# Patient Record
Sex: FEMALE | Race: BLACK OR AFRICAN AMERICAN | NOT HISPANIC OR LATINO | Employment: OTHER | URBAN - METROPOLITAN AREA
[De-identification: names, ages, dates, MRNs, and addresses within clinical notes are randomized per-mention and may not be internally consistent; named-entity substitution may affect disease eponyms.]

---

## 2017-12-09 ENCOUNTER — HOSPITAL ENCOUNTER (EMERGENCY)
Facility: HOSPITAL | Age: 82
Discharge: HOME | End: 2017-12-10
Attending: EMERGENCY MEDICINE
Payer: MEDICARE

## 2017-12-09 VITALS
SYSTOLIC BLOOD PRESSURE: 156 MMHG | WEIGHT: 105 LBS | BODY MASS INDEX: 22.04 KG/M2 | HEIGHT: 58 IN | TEMPERATURE: 99 F | DIASTOLIC BLOOD PRESSURE: 65 MMHG | RESPIRATION RATE: 16 BRPM | HEART RATE: 65 BPM | OXYGEN SATURATION: 100 %

## 2017-12-09 DIAGNOSIS — R52 PAIN: ICD-10-CM

## 2017-12-09 DIAGNOSIS — S80.12XA CONTUSION OF LEFT LOWER LEG, INITIAL ENCOUNTER: Primary | ICD-10-CM

## 2017-12-09 DIAGNOSIS — S89.92XA SHIN INJURY, LEFT, INITIAL ENCOUNTER: ICD-10-CM

## 2017-12-09 PROCEDURE — 25000003 PHARM REV CODE 250: Performed by: EMERGENCY MEDICINE

## 2017-12-09 PROCEDURE — 99283 EMERGENCY DEPT VISIT LOW MDM: CPT

## 2017-12-09 RX ORDER — ACETAMINOPHEN 325 MG/1
650 TABLET ORAL
Status: COMPLETED | OUTPATIENT
Start: 2017-12-09 | End: 2017-12-09

## 2017-12-09 RX ORDER — CALCIUM CARBONATE 500(1250)
1 TABLET ORAL DAILY
COMMUNITY
End: 2019-08-16 | Stop reason: CLARIF

## 2017-12-09 RX ADMIN — ACETAMINOPHEN 650 MG: 325 TABLET ORAL at 11:12

## 2017-12-10 NOTE — ED NOTES
Patient identifiers for Gladys A Severin checked and correct.  LOC:  Patient is awake, alert, and aware of environment with an appropriate affect. Patient is speaking appropriately.  APPEARANCE:  Patient resting comfortably and in no acute distress. Patient is clean and well groomed, patient's clothing is properly fastened.  SKIN:  The skin is warm and dry. Patient has normal skin turgor and moist mucus membranes. Skin is intact; no bruising or breakdown noted.  Mild redness and swelling noted 8 inches distal to knee.  No known injury S/T pt pt dementia   MUSCULOSKELETAL:  Patient is moving all extremities well, no obvious deformities noted. Pulses intact.   RESPIRATORY:  Airway is open and patent. Respirations are spontaneous and non-labored with normal effort and rate.  CARDIAC:  Patient has a normal rate and rhythm. No peripheral edema noted. Capillary refill < 3 seconds.  ABDOMEN:  No distention noted.  Soft and non-tender upon palpation.  NEUROLOGICAL:  PERRL. Facial expression is symmetrical. Hand grasps are equal bilaterally. Normal sensation in all extremities when touched with finger.  Allergies reported:    Review of patient's allergies indicates:   Allergen Reactions    Promethazine      OTHER NOTES:

## 2017-12-10 NOTE — ED PROVIDER NOTES
"Encounter Date: 12/9/2017    SCRIBE #1 NOTE: I, Jacqueline Stephens, am scribing for, and in the presence of,  Dr. Gutierrez. I have scribed the entire note.       History     Chief Complaint   Patient presents with    Leg Pain     Lep pain.  Noted swelling to leg.         12/09/2017 11:13 PM     Chief complaint: Left manley pain      Gladys A Severin is a 89 y.o. female who presents to the ED with an onset of left shin pain with associated swelling after the patient hit her leg on the edge of a piece of furniture "around 4 PM today." Her relative gave her "extra strength Tylenol at 7 PM" with no relief. Patient's PMHx includes dementia. Her PSHx includes a right leg surgery.       The history is provided by the patient and a relative.     Review of patient's allergies indicates:   Allergen Reactions    Promethazine      History reviewed. No pertinent past medical history.  Past Surgical History:   Procedure Laterality Date    LEG SURGERY Right      History reviewed. No pertinent family history.  Social History   Substance Use Topics    Smoking status: Never Smoker    Smokeless tobacco: Never Used    Alcohol use No     Review of Systems   Constitutional: Negative for fever.   HENT: Negative for congestion.    Eyes: Negative for visual disturbance.   Respiratory: Negative for wheezing.    Cardiovascular: Negative for chest pain.   Gastrointestinal: Negative for abdominal pain.   Genitourinary: Negative for dysuria.   Musculoskeletal: Negative for joint swelling.        Positive for left shin pain with associated swelling.    Skin: Negative for rash.   Neurological: Negative for syncope.   Hematological: Does not bruise/bleed easily.   Psychiatric/Behavioral: Negative for confusion.       Physical Exam     Initial Vitals [12/09/17 2153]   BP Pulse Resp Temp SpO2   (!) 156/65 65 16 98.7 °F (37.1 °C) 100 %      MAP       95.33         Physical Exam    Nursing note and vitals reviewed.  Constitutional: She appears " well-nourished.   HENT:   Head: Normocephalic and atraumatic.   Eyes: Conjunctivae and EOM are normal.   Neck: Normal range of motion. Neck supple. No thyroid mass present.   Cardiovascular: Normal rate and regular rhythm.   Abdominal: Soft. Normal appearance and bowel sounds are normal. There is no tenderness.   Musculoskeletal:   Small area of erythema and swelling to left mid anterior shin. No open wounds. No additional leg swelling. No knee or ankle involvement.    Neurological: She is alert and oriented to person, place, and time. She has normal strength. No cranial nerve deficit or sensory deficit.   Skin: Skin is warm and dry. No rash noted. No erythema.   Psychiatric: She has a normal mood and affect. Her speech is normal. Cognition and memory are normal.         ED Course   Procedures  Labs Reviewed - No data to display     Imaging Results    None            Medical Decision Making:   History:   Old Medical Records: I decided to obtain old medical records.  Initial Assessment:   This patient was interviewed and examined in the presence of her daughter is noted be without gross bony abnormality or neurovascular deficit.  Does appear she has localized contusion sitting very to contact injury.  She has a past history of tibial tibia rodding.  Radiograph will be obtained.  The patient was provided oral acetaminophen.  Tetanus is not warranted secondary to the absence of open wounds.  Clinical Tests:   Radiological Study: Reviewed and Ordered  ED Management:  X-rays negative for acute fracture and the patient will be educated about supportive care at home.  She is asked to follow-up with her primary care provider as soon as possible regarding improvement.  She is additionally asked to return to the ER for any new, concerning, or worsening symptoms.  Patient discharged in stable condition.            Scribe Attestation:   Scribe #1: I performed the above scribed service and the documentation accurately describes  the services I performed. I attest to the accuracy of the note.    I, Dr. Jordin Gutierrez, personally performed the services described in this documentation. All medical record entries made by the scribe were at my direction and in my presence.  I have reviewed the chart and agree that the record reflects my personal performance and is accurate and complete. Jordin Gutierrez MD.  3:10 AM 12/10/2017          ED Course      Clinical Impression:     1. Contusion of left lower leg, initial encounter    2. Pain    3. Shin injury, left, initial encounter          Disposition:   Disposition: Discharged  Condition: Stable                        Jordin Gutierrez MD  12/10/17 0310

## 2018-02-08 DIAGNOSIS — R47.02 DYSPHASIA: Primary | ICD-10-CM

## 2018-02-20 ENCOUNTER — HOSPITAL ENCOUNTER (OUTPATIENT)
Dept: RADIOLOGY | Facility: OTHER | Age: 83
Discharge: HOME | End: 2018-02-20
Attending: FAMILY MEDICINE
Payer: MEDICARE

## 2018-02-20 DIAGNOSIS — R47.02 DYSPHASIA: ICD-10-CM

## 2018-02-20 PROCEDURE — 74220 X-RAY XM ESOPHAGUS 1CNTRST: CPT | Mod: TC

## 2018-02-20 PROCEDURE — 74220 X-RAY XM ESOPHAGUS 1CNTRST: CPT | Mod: 26,,, | Performed by: RADIOLOGY

## 2018-03-05 ENCOUNTER — HOSPITAL ENCOUNTER (OUTPATIENT)
Dept: RADIOLOGY | Facility: OTHER | Age: 83
Discharge: HOME | End: 2018-03-05
Attending: FAMILY MEDICINE
Payer: MEDICARE

## 2018-03-05 DIAGNOSIS — F03.90 DEMENTIA, SENILE: ICD-10-CM

## 2018-03-05 DIAGNOSIS — F03.90 DEMENTIA: ICD-10-CM

## 2018-03-05 PROCEDURE — 70450 CT HEAD/BRAIN W/O DYE: CPT | Mod: TC

## 2018-03-05 PROCEDURE — 70450 CT HEAD/BRAIN W/O DYE: CPT | Mod: 26,,, | Performed by: RADIOLOGY

## 2018-08-12 ENCOUNTER — HOSPITAL ENCOUNTER (EMERGENCY)
Facility: HOSPITAL | Age: 83
Discharge: HOME | End: 2018-08-12
Attending: EMERGENCY MEDICINE
Payer: MEDICARE

## 2018-08-12 VITALS
HEIGHT: 58 IN | RESPIRATION RATE: 20 BRPM | OXYGEN SATURATION: 98 % | TEMPERATURE: 98 F | HEART RATE: 63 BPM | DIASTOLIC BLOOD PRESSURE: 72 MMHG | WEIGHT: 105 LBS | BODY MASS INDEX: 22.04 KG/M2 | SYSTOLIC BLOOD PRESSURE: 160 MMHG

## 2018-08-12 DIAGNOSIS — S30.0XXA CONTUSION OF SACRUM, INITIAL ENCOUNTER: Primary | ICD-10-CM
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
## 2018-08-12 PROCEDURE — 99284 EMERGENCY DEPT VISIT MOD MDM: CPT | Mod: 25

## 2018-08-12 NOTE — ED PROVIDER NOTES
Encounter Date: 8/12/2018    SCRIBE #1 NOTE: I, Mandy Webster, am scribing for, and in the presence of, Aure Roth NP.       History     Chief Complaint   Patient presents with    Fall     from chair    Tailbone Pain     ambulatory at scene        08/12/2018  11:58 AM      The patient is a 89 y.o. female with Hx of alzheimer's and osteoarthritis who presents with tailbone pain for approximately 1 hour. Patient slid out of a lounge chair and onto her tailbone. She tried to stop her fall by grabbing the chair with her right hand. The incident was witnessed by her granddaughter. Her granddaughter states she was complaining of right hand pain immediately after the incident. Her granddaughter denies loss of consciousness or head injury. PSHx includes right leg fracture surgery and bilateral knee replacement.       The history is provided by the patient and a relative. The history is limited by the condition of the patient (dementia).     Review of patient's allergies indicates:   Allergen Reactions    Promethazine      History reviewed. No pertinent past medical history.  Past Surgical History:   Procedure Laterality Date    LEG SURGERY Right      History reviewed. No pertinent family history.  Social History     Tobacco Use    Smoking status: Never Smoker    Smokeless tobacco: Never Used   Substance Use Topics    Alcohol use: No    Drug use: No     Review of Systems   Unable to perform ROS: Dementia   Musculoskeletal: Positive for arthralgias (right hand) and myalgias.       Physical Exam     Initial Vitals [08/12/18 1153]   BP Pulse Resp Temp SpO2   (!) 98/57 77 20 97.5 °F (36.4 °C) (!) 94 %      MAP       --         Physical Exam    Nursing note and vitals reviewed.  Constitutional: Vital signs are normal. She appears well-developed and well-nourished.   Patient is able to walk around the room and bend over to touch her toes.   HENT:   Head: Normocephalic and atraumatic.   Eyes: Pupils are equal, round, and  reactive to light.   Neck: Neck supple.   Cardiovascular: Normal rate, regular rhythm, normal heart sounds and intact distal pulses. Exam reveals no gallop and no friction rub.    No murmur heard.  Pulmonary/Chest: Breath sounds normal. She has no wheezes. She has no rhonchi. She has no rales.   Abdominal: Soft. Normal appearance and bowel sounds are normal. There is no tenderness.   Musculoskeletal: She exhibits tenderness.        Cervical back: Normal.        Thoracic back: Normal.        Lumbar back: She exhibits tenderness and bony tenderness. She exhibits normal range of motion, no swelling, no edema, no deformity, no laceration, no pain, no spasm and normal pulse.        Back:         Right hand: Normal.        Left hand: Normal.   Tenderness over coccyx.    Neurological: She is alert and oriented to person, place, and time. She has normal strength. No sensory deficit.   5/5 strength and normal sensation bilateral lower extremities.   Skin: Skin is warm, dry and intact.   Psychiatric: She has a normal mood and affect. Her speech is normal and behavior is normal.         ED Course   Procedures  Labs Reviewed - No data to display       Imaging Results    None          Medical Decision Making:   History:   Old Medical Records: I decided to obtain old medical records.  Differential Diagnosis:   Fracture  Contusion  Muscle strain   Clinical Tests:   Radiological Study: Ordered and Reviewed       APC / Resident Notes:   Patient is a 89 y.o. female who presents to the ED 08/12/2018 who underwent emergent evaluation for a ground level fall.  Patient was attempting to sit in chair and landed on her buttocks.  Fall was witnessed by family member who is present who states that she did not hit her head or lose consciousness.  She is pleasantly confused at baseline due to her Alzheimer's.  There are no signs of head trauma.  She denies neck pain.  She has no midline C or T-spine tenderness. Patient has 5/5 strength and  normal sensation bilateral upper and lower extremities. Patient has tenderness only over her coccyx.  There is no bony deformity, ecchymosis, swelling. Patient is ambulatory in the emergency department and can bend forward and touch her toes without pain or difficulty.  I have a low suspicion for any fracture or pelvic fracture.  X-ray of sacrum and coccyx without acute findings.  Patient does have significant osteoarthritis and degenerative joint disease.  I do not think any acute fracture dislocation is present.  Patient has +2 distal pulses. No signs of neurovascular compromise. Based on my clinical evaluation, I do not appreciate any immediate, emergent, or life threatening condition or etiology that warrants additional workup today and feel that the patient can be discharged with close follow up care. Case discussed with Dr. De La Vega who is agreeable to plan of care. Follow up and return precautions discussed; patient verbalized understanding and is agreeable to plan of care. Patient discharged home in stable condition.               Scribe Attestation:   Scribe #1: I performed the above scribed service and the documentation accurately describes the services I performed. I attest to the accuracy of the note.    Attending Attestation:     Physician Attestation Statement for NP/PA:   I discussed this assessment and plan of this patient with the NP/PA, but I did not personally examine the patient. The face to face encounter was performed by the NP/PA.        Physician Attestation for Scribe:  Physician Attestation Statement for Scribe #1: I, Aure Roth, reviewed documentation, as scribed by in my presence, and it is both accurate and complete.     Comments: I, Aure Roth, NP-C, personally performed the services described in this documentation. All medical record entries made by the scribe were at my direction and in my presence.  I have reviewed the chart and agree that the record reflects my personal  performance and is accurate and complete. JAJA Jauregui.  10:04 PM 08/12/2018 e            ED Course as of Aug 12 1205   Sun Aug 12, 2018   1205 BP: (!) 98/57 [EF]   1205 Temp: 97.5 °F (36.4 °C) [EF]   1205 Temp src: Oral [EF]   1205 Pulse: 77 [EF]   1205 Resp: 20 [EF]   1205 SpO2: (!) 94 % [EF]      ED Course User Index  [EF] Ashok De La Vega MD     Clinical Impression:   The primary encounter diagnosis was Contusion of sacrum, initial encounter. A diagnosis of Fall was also pertinent to this visit.      Disposition:   Disposition: Discharged  Condition: Stable                        Aure Roth NP  08/12/18 2204       Ashok De La Vega MD  08/15/18 0791

## 2019-07-17 ENCOUNTER — HOSPITAL ENCOUNTER (EMERGENCY)
Facility: HOSPITAL | Age: 84
Discharge: HOME | End: 2019-07-17
Attending: EMERGENCY MEDICINE | Admitting: INTERNAL MEDICINE
Payer: MEDICARE

## 2019-07-17 VITALS
HEART RATE: 86 BPM | SYSTOLIC BLOOD PRESSURE: 158 MMHG | BODY MASS INDEX: 17.42 KG/M2 | DIASTOLIC BLOOD PRESSURE: 74 MMHG | HEIGHT: 58 IN | RESPIRATION RATE: 20 BRPM | OXYGEN SATURATION: 98 % | TEMPERATURE: 98 F | WEIGHT: 83 LBS

## 2019-07-17 DIAGNOSIS — R63.4 WEIGHT LOSS: ICD-10-CM

## 2019-07-17 DIAGNOSIS — E86.0 DEHYDRATION: Primary | ICD-10-CM

## 2019-07-17 LAB
ALBUMIN SERPL BCP-MCNC: 3.4 G/DL (ref 3.5–5.2)
ALP SERPL-CCNC: 142 U/L (ref 55–135)
ALT SERPL W/O P-5'-P-CCNC: 5 U/L (ref 10–44)
ANION GAP SERPL CALC-SCNC: 13 MMOL/L (ref 8–16)
AST SERPL-CCNC: 19 U/L (ref 10–40)
BASOPHILS # BLD AUTO: 0.02 K/UL (ref 0–0.2)
BASOPHILS NFR BLD: 0.2 % (ref 0–1.9)
BILIRUB SERPL-MCNC: 0.6 MG/DL (ref 0.1–1)
BILIRUB UR QL STRIP: ABNORMAL
BUN SERPL-MCNC: 13 MG/DL (ref 10–30)
CALCIUM SERPL-MCNC: 10.3 MG/DL (ref 8.7–10.5)
CHLORIDE SERPL-SCNC: 104 MMOL/L (ref 95–110)
CLARITY UR: CLEAR
CO2 SERPL-SCNC: 25 MMOL/L (ref 23–29)
COLOR UR: YELLOW
CREAT SERPL-MCNC: 0.9 MG/DL (ref 0.5–1.4)
DIFFERENTIAL METHOD: ABNORMAL
EOSINOPHIL # BLD AUTO: 0 K/UL (ref 0–0.5)
EOSINOPHIL NFR BLD: 0.2 % (ref 0–8)
ERYTHROCYTE [DISTWIDTH] IN BLOOD BY AUTOMATED COUNT: 12.9 % (ref 11.5–14.5)
EST. GFR  (AFRICAN AMERICAN): >60 ML/MIN/1.73 M^2
EST. GFR  (NON AFRICAN AMERICAN): 55 ML/MIN/1.73 M^2
GLUCOSE SERPL-MCNC: 133 MG/DL (ref 70–110)
GLUCOSE UR QL STRIP: NEGATIVE
HCT VFR BLD AUTO: 37.1 % (ref 37–48.5)
HGB BLD-MCNC: 11.3 G/DL (ref 12–16)
HGB UR QL STRIP: ABNORMAL
IMM GRANULOCYTES # BLD AUTO: 0.04 K/UL (ref 0–0.04)
KETONES UR QL STRIP: ABNORMAL
LEUKOCYTE ESTERASE UR QL STRIP: NEGATIVE
LIPASE SERPL-CCNC: 20 U/L (ref 4–60)
LYMPHOCYTES # BLD AUTO: 1.2 K/UL (ref 1–4.8)
LYMPHOCYTES NFR BLD: 11 % (ref 18–48)
MAGNESIUM SERPL-MCNC: 2.1 MG/DL (ref 1.6–2.6)
MCH RBC QN AUTO: 27.9 PG (ref 27–31)
MCHC RBC AUTO-ENTMCNC: 30.5 G/DL (ref 32–36)
MCV RBC AUTO: 92 FL (ref 82–98)
MONOCYTES # BLD AUTO: 0.8 K/UL (ref 0.3–1)
MONOCYTES NFR BLD: 7.5 % (ref 4–15)
NEUTROPHILS # BLD AUTO: 9.1 K/UL (ref 1.8–7.7)
NEUTROPHILS NFR BLD: 80.7 % (ref 38–73)
NITRITE UR QL STRIP: NEGATIVE
NRBC BLD-RTO: 0 /100 WBC
PH UR STRIP: 6 [PH] (ref 5–8)
PHOSPHATE SERPL-MCNC: 3 MG/DL (ref 2.7–4.5)
PLATELET # BLD AUTO: 361 K/UL (ref 150–350)
PMV BLD AUTO: 8.8 FL (ref 9.2–12.9)
POTASSIUM SERPL-SCNC: 3.8 MMOL/L (ref 3.5–5.1)
PROT SERPL-MCNC: 7.6 G/DL (ref 6–8.4)
PROT UR QL STRIP: ABNORMAL
RBC # BLD AUTO: 4.05 M/UL (ref 4–5.4)
SODIUM SERPL-SCNC: 142 MMOL/L (ref 136–145)
SP GR UR STRIP: >=1.03 (ref 1–1.03)
URN SPEC COLLECT METH UR: ABNORMAL
UROBILINOGEN UR STRIP-ACNC: ABNORMAL EU/DL
WBC # BLD AUTO: 11.21 K/UL (ref 3.9–12.7)

## 2019-07-17 PROCEDURE — 96360 HYDRATION IV INFUSION INIT: CPT

## 2019-07-17 PROCEDURE — 83690 ASSAY OF LIPASE: CPT

## 2019-07-17 PROCEDURE — 84100 ASSAY OF PHOSPHORUS: CPT

## 2019-07-17 PROCEDURE — 81003 URINALYSIS AUTO W/O SCOPE: CPT

## 2019-07-17 PROCEDURE — 36415 COLL VENOUS BLD VENIPUNCTURE: CPT

## 2019-07-17 PROCEDURE — 25000003 PHARM REV CODE 250: Performed by: EMERGENCY MEDICINE

## 2019-07-17 PROCEDURE — 85025 COMPLETE CBC W/AUTO DIFF WBC: CPT

## 2019-07-17 PROCEDURE — 83735 ASSAY OF MAGNESIUM: CPT

## 2019-07-17 PROCEDURE — P9612 CATHETERIZE FOR URINE SPEC: HCPCS

## 2019-07-17 PROCEDURE — 99284 EMERGENCY DEPT VISIT MOD MDM: CPT | Mod: 25

## 2019-07-17 PROCEDURE — 12000002 HC ACUTE/MED SURGE SEMI-PRIVATE ROOM

## 2019-07-17 PROCEDURE — 80053 COMPREHEN METABOLIC PANEL: CPT

## 2019-07-17 RX ORDER — DONEPEZIL HYDROCHLORIDE 10 MG/1
10 TABLET, FILM COATED ORAL NIGHTLY
COMMUNITY

## 2019-07-17 RX ORDER — PANTOPRAZOLE SODIUM 40 MG/1
40 TABLET, DELAYED RELEASE ORAL DAILY
Qty: 30 TABLET | Refills: 3 | Status: SHIPPED | OUTPATIENT
Start: 2019-07-17 | End: 2019-08-16 | Stop reason: CLARIF

## 2019-07-17 RX ORDER — ONDANSETRON HYDROCHLORIDE 8 MG/1
8 TABLET, FILM COATED ORAL EVERY 12 HOURS PRN
Qty: 6 TABLET | Refills: 0 | Status: SHIPPED | OUTPATIENT
Start: 2019-07-17

## 2019-07-17 RX ORDER — PANTOPRAZOLE SODIUM 40 MG/1
40 TABLET, DELAYED RELEASE ORAL
Status: COMPLETED | OUTPATIENT
Start: 2019-07-17 | End: 2019-07-17

## 2019-07-17 RX ORDER — ACETAMINOPHEN 325 MG/1
325 TABLET ORAL
Status: COMPLETED | OUTPATIENT
Start: 2019-07-17 | End: 2019-07-17

## 2019-07-17 RX ORDER — BUTALBITAL, ACETAMINOPHEN AND CAFFEINE 50; 325; 40 MG/1; MG/1; MG/1
1 TABLET ORAL
Status: COMPLETED | OUTPATIENT
Start: 2019-07-17 | End: 2019-07-17

## 2019-07-17 RX ORDER — MELOXICAM 7.5 MG/1
7.5 TABLET ORAL DAILY
COMMUNITY
End: 2019-08-16 | Stop reason: CLARIF

## 2019-07-17 RX ADMIN — SODIUM CHLORIDE 500 ML: 0.9 INJECTION, SOLUTION INTRAVENOUS at 08:07

## 2019-07-17 RX ADMIN — PANTOPRAZOLE SODIUM 40 MG: 40 TABLET, DELAYED RELEASE ORAL at 08:07

## 2019-07-17 RX ADMIN — BUTALBITAL, ACETAMINOPHEN AND CAFFEINE 1 TABLET: 50; 325; 40 TABLET ORAL at 08:07

## 2019-07-17 RX ADMIN — ACETAMINOPHEN 325 MG: 325 TABLET ORAL at 08:07

## 2019-07-18 NOTE — ED PROVIDER NOTES
Encounter Date: 7/17/2019    SCRIBE #1 NOTE: I, Fátima Mercado, am scribing for, and in the presence of, Ion Mckeon MD.       History     Chief Complaint   Patient presents with    Emesis     started today    Headache     and neck pain - x 2 weeks     Abdominal Pain     mid/epigastric        Time seen by provider: 7:40 PM on 07/17/2019    Gladys Severin is a 89 y.o. female who presents to the ED with an onset of neck pain and intermittent HA's over the past 2 weeks with associated nausea and vomiting. Per daughter, the patient has been seen by his PCP, Dr. Fany Majano, for her symptoms and has had a XR of her neck that revealed DDD. Dr. Majano referred her to the ER for an MRI of the patient's head due to insurance reasons. The daughter is concerned for possible dehydration due to the patient feeling weak and not eating with subsequent 7 lbs weight loss. Currently, her HA has subsided. The patient is currently on Meloxicam for ongoing thrush x3 years. She resides with her daughter. The daughter denies history of a colonoscopy or any other symptoms at this time. She has a PMHx of dementia. No abdominal PSHx noted. Promethazine drug allergy noted.    The history is provided by the patient and a relative (daughter).     Review of patient's allergies indicates:   Allergen Reactions    Promethazine      History reviewed. No pertinent past medical history.  Past Surgical History:   Procedure Laterality Date    LEG SURGERY Right      History reviewed. No pertinent family history.  Social History     Tobacco Use    Smoking status: Never Smoker    Smokeless tobacco: Never Used   Substance Use Topics    Alcohol use: No    Drug use: No     Review of Systems   Constitutional: Positive for appetite change and unexpected weight change (7 lbs / 2 weeks). Negative for activity change, chills, fatigue and fever.   HENT: Positive for trouble swallowing (secondary to thrush).    Eyes: Negative for visual disturbance.    Respiratory: Negative for apnea and shortness of breath.    Cardiovascular: Negative for chest pain and palpitations.   Gastrointestinal: Positive for nausea and vomiting. Negative for abdominal distention and abdominal pain.   Genitourinary: Negative for difficulty urinating.   Musculoskeletal: Positive for neck pain.   Skin: Negative for pallor and rash.   Neurological: Positive for weakness (generalized) and headaches (resolved).   Hematological: Does not bruise/bleed easily.   Psychiatric/Behavioral: Negative for agitation.     Physical Exam     Initial Vitals [07/17/19 1928]   BP Pulse Resp Temp SpO2   (!) 182/84 76 20 97.6 °F (36.4 °C) 100 %      MAP       --         Physical Exam    Nursing note and vitals reviewed.  Constitutional: She appears well-developed and well-nourished.   [place].    HENT:   Head: Normocephalic and atraumatic.   Eyes: Conjunctivae are normal.   Neck: Normal range of motion. Neck supple.   Cardiovascular: Normal rate and regular rhythm. Exam reveals no gallop and no friction rub.    Murmur heard.   Systolic murmur is present with a grade of 3/6.  Pulmonary/Chest: Effort normal and breath sounds normal. No respiratory distress. She has no wheezes. She has no rhonchi. She has no rales.   Abdominal: Soft. She exhibits no distension. There is no tenderness.   Soft, non-tender abdomen.    Musculoskeletal: Normal range of motion.   Neurological: She is alert. She has normal strength.   Skin: Skin is warm and dry. No erythema.   Psychiatric: She has a normal mood and affect.       ED Course   Procedures  Labs Reviewed   COMPREHENSIVE METABOLIC PANEL - Abnormal; Notable for the following components:       Result Value    Glucose 133 (*)     Albumin 3.4 (*)     Alkaline Phosphatase 142 (*)     ALT 5 (*)     eGFR if non  55 (*)     All other components within normal limits   CBC W/ AUTO DIFFERENTIAL - Abnormal; Notable for the following components:    Hemoglobin 11.3 (*)     Mean  Corpuscular Hemoglobin Conc 30.5 (*)     Platelets 361 (*)     MPV 8.8 (*)     Gran # (ANC) 9.1 (*)     Gran% 80.7 (*)     Lymph% 11.0 (*)     All other components within normal limits   URINALYSIS, REFLEX TO URINE CULTURE - Abnormal; Notable for the following components:    Specific Gravity, UA >=1.030 (*)     Protein, UA Trace (*)     Ketones, UA Trace (*)     Bilirubin (UA) 1+ (*)     Occult Blood UA Trace (*)     Urobilinogen, UA 2.0-3.0 (*)     All other components within normal limits    Narrative:     Preferred Collection Type->Urine, Clean Catch   MAGNESIUM   PHOSPHORUS   LIPASE        Imaging Results          X-Ray Chest AP Portable (In process)                  Medical Decision Making:   History:   Old Medical Records: I decided to obtain old medical records.  Independently Interpreted Test(s):   I have ordered and independently interpreted X-rays - see summary below.       <> Summary of X-Ray Reading(s): Chest x-ray interpreted by me reveals no infiltrates, effusions or mediastinal widening  Clinical Tests:   Lab Tests: Ordered and Reviewed  Radiological Study: Reviewed and Ordered  ED Management:  89-year-old female presents with a multitude of complaints and includes but is not limited to headache, neck pain, postprandial emesis with associated weight loss and generalized weakness. She adamantly denies headache. She has known advanced degenerative disc disease of the cervical spine.  The symptoms most likely account for the lower cranial pain.  Absence of ongoing headache and absence of focal neurologic deficits make immediate imaging necessary.  She will be placed on a PPI for possible gastritis/esophagitis.  She is given Zofran for nausea vomiting and referred to Gastroenterology for evaluation of same.  Chest x-ray fails to demonstrate any acute abnormalities.  She has an elevated urine specific gravity suggesting dehydration.  She reports symptomatic improvement with IV fluids.       APC / Resident  Notes:   I, Dr. Ion Mckeon III, personally performed the services described in this documentation. All medical record entries made by the scribe were at my direction and in my presence.  I have reviewed the chart and agree that the record reflects my personal performance and is accurate and complete       Scribe Attestation:   Scribe #1: I performed the above scribed service and the documentation accurately describes the services I performed. I attest to the accuracy of the note.               Clinical Impression:       ICD-10-CM ICD-9-CM   1. Dehydration E86.0 276.51   2. Weight loss R63.4 783.21         Disposition:   Disposition: Discharged  Condition: Stable                        Ion Mckeon III, MD  07/17/19 0309

## 2019-08-16 ENCOUNTER — HOSPITAL ENCOUNTER (INPATIENT)
Facility: HOSPITAL | Age: 84
LOS: 3 days | Discharge: HOME HEALTH | DRG: 640 | End: 2019-08-19
Attending: EMERGENCY MEDICINE | Admitting: INTERNAL MEDICINE
Payer: MEDICARE

## 2019-08-16 DIAGNOSIS — E86.0 DEHYDRATION: ICD-10-CM

## 2019-08-16 DIAGNOSIS — E87.0 HYPERNATREMIA: Primary | ICD-10-CM

## 2019-08-16 PROBLEM — R63.4 WEIGHT LOSS: Status: ACTIVE | Noted: 2019-08-16

## 2019-08-16 PROBLEM — N17.9 ACUTE RENAL FAILURE: Status: ACTIVE | Noted: 2019-08-16

## 2019-08-16 PROBLEM — K21.9 GERD (GASTROESOPHAGEAL REFLUX DISEASE): Status: ACTIVE | Noted: 2019-08-16

## 2019-08-16 PROBLEM — F03.918 DEMENTIA WITH BEHAVIORAL DISTURBANCE: Status: ACTIVE | Noted: 2019-08-16

## 2019-08-16 PROBLEM — R73.9 HYPERGLYCEMIA: Status: ACTIVE | Noted: 2019-08-16

## 2019-08-16 LAB
ALBUMIN SERPL BCP-MCNC: 3 G/DL (ref 3.5–5.2)
ALP SERPL-CCNC: 133 U/L (ref 55–135)
ALT SERPL W/O P-5'-P-CCNC: 32 U/L (ref 10–44)
ANION GAP SERPL CALC-SCNC: ABNORMAL MMOL/L (ref 8–16)
AST SERPL-CCNC: 47 U/L (ref 10–40)
BACTERIA #/AREA URNS HPF: ABNORMAL /HPF
BASOPHILS # BLD AUTO: 0.02 K/UL (ref 0–0.2)
BASOPHILS NFR BLD: 0.2 % (ref 0–1.9)
BILIRUB SERPL-MCNC: 1 MG/DL (ref 0.1–1)
BILIRUB UR QL STRIP: ABNORMAL
BUN SERPL-MCNC: 39 MG/DL (ref 10–30)
CALCIUM SERPL-MCNC: 10.5 MG/DL (ref 8.7–10.5)
CHLORIDE SERPL-SCNC: >130 MMOL/L (ref 95–110)
CLARITY UR: CLEAR
CO2 SERPL-SCNC: 24 MMOL/L (ref 23–29)
COLOR UR: YELLOW
CREAT SERPL-MCNC: 1.4 MG/DL (ref 0.5–1.4)
DIFFERENTIAL METHOD: ABNORMAL
EOSINOPHIL # BLD AUTO: 0 K/UL (ref 0–0.5)
EOSINOPHIL NFR BLD: 0.3 % (ref 0–8)
ERYTHROCYTE [DISTWIDTH] IN BLOOD BY AUTOMATED COUNT: 15 % (ref 11.5–14.5)
EST. GFR  (AFRICAN AMERICAN): 37 ML/MIN/1.73 M^2
EST. GFR  (NON AFRICAN AMERICAN): 32 ML/MIN/1.73 M^2
GLUCOSE SERPL-MCNC: 156 MG/DL (ref 70–110)
GLUCOSE UR QL STRIP: NEGATIVE
HCT VFR BLD AUTO: 52.4 % (ref 37–48.5)
HGB BLD-MCNC: 14.8 G/DL (ref 12–16)
HGB UR QL STRIP: ABNORMAL
HYALINE CASTS #/AREA URNS LPF: 0 /LPF
IMM GRANULOCYTES # BLD AUTO: 0.09 K/UL (ref 0–0.04)
KETONES UR QL STRIP: NEGATIVE
LEUKOCYTE ESTERASE UR QL STRIP: ABNORMAL
LYMPHOCYTES # BLD AUTO: 2.2 K/UL (ref 1–4.8)
LYMPHOCYTES NFR BLD: 19.6 % (ref 18–48)
MCH RBC QN AUTO: 28.5 PG (ref 27–31)
MCHC RBC AUTO-ENTMCNC: 28.2 G/DL (ref 32–36)
MCV RBC AUTO: 101 FL (ref 82–98)
MICROSCOPIC COMMENT: ABNORMAL
MONOCYTES # BLD AUTO: 0.8 K/UL (ref 0.3–1)
MONOCYTES NFR BLD: 7.2 % (ref 4–15)
NEUTROPHILS # BLD AUTO: 8.1 K/UL (ref 1.8–7.7)
NEUTROPHILS NFR BLD: 71.9 % (ref 38–73)
NITRITE UR QL STRIP: NEGATIVE
NRBC BLD-RTO: 0 /100 WBC
PH UR STRIP: 6 [PH] (ref 5–8)
PLATELET # BLD AUTO: 195 K/UL (ref 150–350)
PMV BLD AUTO: 11.2 FL (ref 9.2–12.9)
POTASSIUM SERPL-SCNC: 3.8 MMOL/L (ref 3.5–5.1)
PROT SERPL-MCNC: 7.9 G/DL (ref 6–8.4)
PROT UR QL STRIP: ABNORMAL
RBC # BLD AUTO: 5.19 M/UL (ref 4–5.4)
RBC #/AREA URNS HPF: 0 /HPF (ref 0–4)
SODIUM SERPL-SCNC: 175 MMOL/L (ref 136–145)
SP GR UR STRIP: 1.02 (ref 1–1.03)
URN SPEC COLLECT METH UR: ABNORMAL
UROBILINOGEN UR STRIP-ACNC: ABNORMAL EU/DL
WBC # BLD AUTO: 11.27 K/UL (ref 3.9–12.7)
WBC #/AREA URNS HPF: 9 /HPF (ref 0–5)

## 2019-08-16 PROCEDURE — P9612 CATHETERIZE FOR URINE SPEC: HCPCS

## 2019-08-16 PROCEDURE — 94761 N-INVAS EAR/PLS OXIMETRY MLT: CPT

## 2019-08-16 PROCEDURE — 99291 CRITICAL CARE FIRST HOUR: CPT | Mod: 25

## 2019-08-16 PROCEDURE — 85025 COMPLETE CBC W/AUTO DIFF WBC: CPT

## 2019-08-16 PROCEDURE — 81000 URINALYSIS NONAUTO W/SCOPE: CPT

## 2019-08-16 PROCEDURE — 25000003 PHARM REV CODE 250: Performed by: INTERNAL MEDICINE

## 2019-08-16 PROCEDURE — 36415 COLL VENOUS BLD VENIPUNCTURE: CPT

## 2019-08-16 PROCEDURE — 80053 COMPREHEN METABOLIC PANEL: CPT

## 2019-08-16 PROCEDURE — 12000002 HC ACUTE/MED SURGE SEMI-PRIVATE ROOM

## 2019-08-16 PROCEDURE — 63600175 PHARM REV CODE 636 W HCPCS: Performed by: EMERGENCY MEDICINE

## 2019-08-16 PROCEDURE — C9113 INJ PANTOPRAZOLE SODIUM, VIA: HCPCS | Performed by: INTERNAL MEDICINE

## 2019-08-16 PROCEDURE — 63600175 PHARM REV CODE 636 W HCPCS: Performed by: INTERNAL MEDICINE

## 2019-08-16 RX ORDER — ONDANSETRON 2 MG/ML
8 INJECTION INTRAMUSCULAR; INTRAVENOUS EVERY 8 HOURS PRN
Status: DISCONTINUED | OUTPATIENT
Start: 2019-08-16 | End: 2019-08-19 | Stop reason: HOSPADM

## 2019-08-16 RX ORDER — RAMELTEON 8 MG/1
8 TABLET ORAL NIGHTLY PRN
Status: DISCONTINUED | OUTPATIENT
Start: 2019-08-16 | End: 2019-08-19 | Stop reason: HOSPADM

## 2019-08-16 RX ORDER — LANOLIN ALCOHOL/MO/W.PET/CERES
800 CREAM (GRAM) TOPICAL
Status: DISCONTINUED | OUTPATIENT
Start: 2019-08-16 | End: 2019-08-19 | Stop reason: HOSPADM

## 2019-08-16 RX ORDER — HEPARIN SODIUM 5000 [USP'U]/ML
5000 INJECTION, SOLUTION INTRAVENOUS; SUBCUTANEOUS EVERY 12 HOURS
Status: DISCONTINUED | OUTPATIENT
Start: 2019-08-16 | End: 2019-08-19 | Stop reason: HOSPADM

## 2019-08-16 RX ORDER — POTASSIUM CHLORIDE 20 MEQ/15ML
40 SOLUTION ORAL
Status: DISCONTINUED | OUTPATIENT
Start: 2019-08-16 | End: 2019-08-19 | Stop reason: HOSPADM

## 2019-08-16 RX ORDER — SODIUM CHLORIDE 0.9 % (FLUSH) 0.9 %
10 SYRINGE (ML) INJECTION
Status: DISCONTINUED | OUTPATIENT
Start: 2019-08-16 | End: 2019-08-19 | Stop reason: HOSPADM

## 2019-08-16 RX ORDER — IPRATROPIUM BROMIDE AND ALBUTEROL SULFATE 2.5; .5 MG/3ML; MG/3ML
3 SOLUTION RESPIRATORY (INHALATION) EVERY 6 HOURS PRN
COMMUNITY

## 2019-08-16 RX ORDER — AMOXICILLIN 250 MG
1 CAPSULE ORAL 2 TIMES DAILY
Status: DISCONTINUED | OUTPATIENT
Start: 2019-08-16 | End: 2019-08-19 | Stop reason: HOSPADM

## 2019-08-16 RX ORDER — LACTULOSE 10 G/15ML
10 SOLUTION ORAL; RECTAL DAILY PRN
COMMUNITY

## 2019-08-16 RX ORDER — ACETAMINOPHEN 500 MG
1000 TABLET ORAL EVERY 6 HOURS PRN
Status: DISCONTINUED | OUTPATIENT
Start: 2019-08-16 | End: 2019-08-19 | Stop reason: HOSPADM

## 2019-08-16 RX ORDER — DEXTROSE MONOHYDRATE 50 MG/ML
INJECTION, SOLUTION INTRAVENOUS CONTINUOUS
Status: DISCONTINUED | OUTPATIENT
Start: 2019-08-16 | End: 2019-08-17

## 2019-08-16 RX ADMIN — DEXTROSE 8 MG/HR: 50 INJECTION, SOLUTION INTRAVENOUS at 11:08

## 2019-08-16 RX ADMIN — SODIUM CHLORIDE 500 ML: 0.9 INJECTION, SOLUTION INTRAVENOUS at 02:08

## 2019-08-16 RX ADMIN — SENNOSIDES, DOCUSATE SODIUM 1 TABLET: 50; 8.6 TABLET, FILM COATED ORAL at 08:08

## 2019-08-16 RX ADMIN — HEPARIN SODIUM 5000 UNITS: 5000 INJECTION, SOLUTION INTRAVENOUS; SUBCUTANEOUS at 08:08

## 2019-08-16 RX ADMIN — DEXTROSE: 5 SOLUTION INTRAVENOUS at 08:08

## 2019-08-16 RX ADMIN — DEXTROSE 8 MG/HR: 50 INJECTION, SOLUTION INTRAVENOUS at 08:08

## 2019-08-16 NOTE — ASSESSMENT & PLAN NOTE
Apparently her symptoms are significant according to the daughter.  I will give her IV Protonix for now

## 2019-08-16 NOTE — ASSESSMENT & PLAN NOTE
The patient does not have a history of diabetes mellitus.  We will obtain Accu-Cheks q.a.c. and HS

## 2019-08-16 NOTE — ASSESSMENT & PLAN NOTE
Secondary to poor intake.  Unclear if it is her dementia or some other abnormality that caused her to lose her appetite.  I will check a TSH level.

## 2019-08-16 NOTE — SUBJECTIVE & OBJECTIVE
Past Medical History:   Diagnosis Date    GERD (gastroesophageal reflux disease)        Past Surgical History:   Procedure Laterality Date    LEG SURGERY Right        Review of patient's allergies indicates:   Allergen Reactions    Promethazine        No current facility-administered medications on file prior to encounter.      Current Outpatient Medications on File Prior to Encounter   Medication Sig    albuterol-ipratropium (DUO-NEB) 2.5 mg-0.5 mg/3 mL nebulizer solution Take 3 mLs by nebulization every 6 (six) hours as needed for Wheezing or Shortness of Breath. Rescue    donepezil (ARICEPT) 10 MG tablet Take 10 mg by mouth every evening.    lactulose (CHRONULAC) 10 gram/15 mL solution Take 10 g by mouth daily as needed (constipation).     ondansetron (ZOFRAN) 8 MG tablet Take 1 tablet (8 mg total) by mouth every 12 (twelve) hours as needed for Nausea. (Patient taking differently: Take 8 mg by mouth every 12 (twelve) hours as needed for Nausea. Take for 3 days.)    ranitidine (ZANTAC) 15 mg/mL syrup Take 75 mg by mouth every 12 (twelve) hours.    [DISCONTINUED] calcium carbonate (OS-LINCOLN) 500 mg calcium (1,250 mg) tablet Take 1 tablet by mouth once daily.    [DISCONTINUED] meloxicam (MOBIC) 7.5 MG tablet Take 7.5 mg by mouth once daily.    [DISCONTINUED] pantoprazole (PROTONIX) 40 MG tablet Take 1 tablet (40 mg total) by mouth once daily.     Family History     None        Tobacco Use    Smoking status: Never Smoker    Smokeless tobacco: Never Used   Substance and Sexual Activity    Alcohol use: No    Drug use: No    Sexual activity: Not on file     Review of Systems   Unable to perform ROS: Dementia     Objective:     Vital Signs (Most Recent):  Temp: 98.8 °F (37.1 °C) (08/16/19 1413)  Pulse: 100 (08/16/19 1640)  Resp: 20 (08/16/19 1413)  BP: 121/80 (08/16/19 1622)  SpO2: 99 % (08/16/19 1640) Vital Signs (24h Range):  Temp:  [98.8 °F (37.1 °C)] 98.8 °F (37.1 °C)  Pulse:  [] 100  Resp:  [20]  20  SpO2:  [92 %-99 %] 99 %  BP: (121-157)/(79-85) 121/80     Weight: 37.6 kg (83 lb)  Body mass index is 17.35 kg/m².    Physical Exam   Constitutional:   Bitemporal wasting   HENT:   Head: Normocephalic and atraumatic.   Eyes: Pupils are equal, round, and reactive to light. EOM are normal.   Neck: Normal range of motion. Neck supple. No JVD present.   Cardiovascular: Normal rate, regular rhythm and intact distal pulses.   Murmur heard.  Pulmonary/Chest: Effort normal and breath sounds normal. No respiratory distress. She has no rales.   Abdominal: Soft. Bowel sounds are normal. She exhibits no distension. There is no tenderness. There is no rebound.   Musculoskeletal: Normal range of motion. She exhibits no edema or deformity.   Neurological: She is alert. No cranial nerve deficit or sensory deficit.   The patient is confused and her speech is dysarthric   Skin: Skin is warm and dry. No rash noted.   Nursing note and vitals reviewed.        CRANIAL NERVES     CN III, IV, VI   Pupils are equal, round, and reactive to light.  Extraocular motions are normal.        Significant Labs:   CBC:   Recent Labs   Lab 08/16/19  1449   WBC 11.27   HGB 14.8   HCT 52.4*        CMP:   Recent Labs   Lab 08/16/19  1449   *   K 3.8   CL >130*   CO2 24   *   BUN 39*   CREATININE 1.4   CALCIUM 10.5   PROT 7.9   ALBUMIN 3.0*   BILITOT 1.0   ALKPHOS 133   AST 47*   ALT 32   ANIONGAP Unable to calculate   EGFRNONAA 32*       Significant Imaging: I have reviewed all pertinent imaging results/findings within the past 24 hours.

## 2019-08-16 NOTE — H&P
[facility] Medicine  History & Physical    Patient Name: Gladys A Severin  MRN: [id number]  Admission Date: 8/16/2019  Attending Physician: No att. providers found   Primary Care Provider: Fany Majano DO         Patient information was obtained from relative(s) and ER records.     Subjective:     Principal Problem:Hypernatremia    Chief Complaint:  Hypernatremia  Chief Complaint   Patient presents with    Fatigue     decreased appetite.        HPI: The patient is a 89-year-old woman who has not been doing well since July when she started complaining of neck pain. She then stopped eating or drinking much and has lost possibly 20 lbs according to her family.  She has become extremely weak and is no longer able to walk without max assistance.  Her dementia has also gotten worse.    The patient is unable to provide any history because of her dementia but her daughter who is her primary caregiver was able to answer all my questions.  The patient has not been vomiting and has not had any diarrhea.  She has not complained of pain.  She has not fallen.  She does not take medications other than Zantac for GERD.    Past Medical History:   Diagnosis Date    GERD (gastroesophageal reflux disease)        Past Surgical History:   Procedure Laterality Date    LEG SURGERY Right        Review of patient's allergies indicates:   Allergen Reactions    Promethazine        No current facility-administered medications on file prior to encounter.      Current Outpatient Medications on File Prior to Encounter   Medication Sig    albuterol-ipratropium (DUO-NEB) 2.5 mg-0.5 mg/3 mL nebulizer solution Take 3 mLs by nebulization every 6 (six) hours as needed for Wheezing or Shortness of Breath. Rescue    donepezil (ARICEPT) 10 MG tablet Take 10 mg by mouth every evening.    lactulose (CHRONULAC) 10 gram/15 mL solution Take 10 g by mouth daily as needed (constipation).     ondansetron (ZOFRAN) 8 MG tablet  Take 1 tablet (8 mg total) by mouth every 12 (twelve) hours as needed for Nausea. (Patient taking differently: Take 8 mg by mouth every 12 (twelve) hours as needed for Nausea. Take for 3 days.)    ranitidine (ZANTAC) 15 mg/mL syrup Take 75 mg by mouth every 12 (twelve) hours.    [DISCONTINUED] calcium carbonate (OS-LINCOLN) 500 mg calcium (1,250 mg) tablet Take 1 tablet by mouth once daily.    [DISCONTINUED] meloxicam (MOBIC) 7.5 MG tablet Take 7.5 mg by mouth once daily.    [DISCONTINUED] pantoprazole (PROTONIX) 40 MG tablet Take 1 tablet (40 mg total) by mouth once daily.     Family History     None        Tobacco Use    Smoking status: Never Smoker    Smokeless tobacco: Never Used   Substance and Sexual Activity    Alcohol use: No    Drug use: No    Sexual activity: Not on file     Review of Systems   Unable to perform ROS: Dementia     Objective:     Vital Signs (Most Recent):  Temp: 98.8 °F (37.1 °C) (08/16/19 1413)  Pulse: 100 (08/16/19 1640)  Resp: 20 (08/16/19 1413)  BP: 121/80 (08/16/19 1622)  SpO2: 99 % (08/16/19 1640) Vital Signs (24h Range):  Temp:  [98.8 °F (37.1 °C)] 98.8 °F (37.1 °C)  Pulse:  [] 100  Resp:  [20] 20  SpO2:  [92 %-99 %] 99 %  BP: (121-157)/(79-85) 121/80     Weight: 37.6 kg (83 lb)  Body mass index is 17.35 kg/m².    Physical Exam   Constitutional:   Bitemporal wasting   HENT:   Head: Normocephalic and atraumatic.   Eyes: Pupils are equal, round, and reactive to light. EOM are normal.   Neck: Normal range of motion. Neck supple. No JVD present.   Cardiovascular: Normal rate, regular rhythm and intact distal pulses.   Murmur heard.  Pulmonary/Chest: Effort normal and breath sounds normal. No respiratory distress. She has no rales.   Abdominal: Soft. Bowel sounds are normal. She exhibits no distension. There is no tenderness. There is no rebound.   Musculoskeletal: Normal range of motion. She exhibits no edema or deformity.   Neurological: She is alert. No cranial nerve deficit  or sensory deficit.   The patient is confused and her speech is dysarthric   Skin: Skin is warm and dry. No rash noted.   Nursing note and vitals reviewed.        CRANIAL NERVES     CN III, IV, VI   Pupils are equal, round, and reactive to light.  Extraocular motions are normal.        Significant Labs:   CBC:   Recent Labs   Lab 08/16/19  1449   WBC 11.27   HGB 14.8   HCT 52.4*        CMP:   Recent Labs   Lab 08/16/19  1449   *   K 3.8   CL >130*   CO2 24   *   BUN 39*   CREATININE 1.4   CALCIUM 10.5   PROT 7.9   ALBUMIN 3.0*   BILITOT 1.0   ALKPHOS 133   AST 47*   ALT 32   ANIONGAP Unable to calculate   EGFRNONAA 32*       Significant Imaging: I have reviewed all pertinent imaging results/findings within the past 24 hours.    Assessment/Plan:     * Hypernatremia  The patient has severe hypernatremia due to water deficiency.  She will be treated with D5W at 100 cc an hour and we will monitor her sodium closely.      Acute renal failure  Secondary to dehydration from poor oral intake      Hyperglycemia  The patient does not have a history of diabetes mellitus.  We will obtain Accu-Cheks q.a.c. and HS      Weight loss  Secondary to poor intake.  Unclear if it is her dementia or some other abnormality that caused her to lose her appetite.  I will check a TSH level.      Dementia with behavioral disturbance  The patient has underlying dementia but her mental status has gotten worse recently because of the dehydration and severe hypernatremia      GERD (gastroesophageal reflux disease)  Apparently her symptoms are significant according to the daughter.  I will give her IV Protonix for now      VTE Risk Mitigation (From admission, onward)        Ordered     heparin (porcine) injection 5,000 Units  Every 12 hours      08/16/19 1733     IP VTE HIGH RISK PATIENT  Once      08/16/19 1733             Brigitte Shelby MD  Department of Hospital Medicine   [facility]

## 2019-08-16 NOTE — ED PROVIDER NOTES
Encounter Date: 8/16/2019    SCRIBE #1 NOTE: I, Fátima Mercado, am scribing for, and in the presence of, Julio Arceo MD.       History     Chief Complaint   Patient presents with    Fatigue     decreased appetite.       Time seen by provider: 2:17 PM on 08/16/2019    Gladys Severin is a 89 y.o. female with DM and dementia who presents to the ED with an onset of dark urine. Per daughter, the patient not been eating or drinking for the past month. Two days ago, the daughter called for an ambulance to come to the given her IV fluids. She called her PCP earlier today and was referred to the ER for further evaluation. The patient PSHx including right leg surgery. Promethazine drug allergy.    The history is provided by the spouse, the patient and a relative ( & daughter).     Review of patient's allergies indicates:   Allergen Reactions    Promethazine      Past Medical History:   Diagnosis Date    GERD (gastroesophageal reflux disease)      Past Surgical History:   Procedure Laterality Date    LEG SURGERY Right      History reviewed. No pertinent family history.  Social History     Tobacco Use    Smoking status: Never Smoker    Smokeless tobacco: Never Used   Substance Use Topics    Alcohol use: No    Drug use: No     Review of Systems   Unable to perform ROS: Dementia   Constitutional: Positive for appetite change (& fluids).   Genitourinary:        Positive for urine color change.      Physical Exam     Initial Vitals [08/16/19 1413]   BP Pulse Resp Temp SpO2   125/82 (!) 149 20 98.8 °F (37.1 °C) 96 %      MAP       --         Physical Exam    Nursing note and vitals reviewed.  Constitutional: She appears well-developed and well-nourished. She is not diaphoretic. No distress.   HENT:   Head: Normocephalic and atraumatic.   Mouth/Throat: Mucous membranes are dry.   Eyes: EOM are normal. Pupils are equal, round, and reactive to light.   Neck: Normal range of motion. Neck supple.   Cardiovascular:  Regular rhythm, normal heart sounds and intact distal pulses. Tachycardia present.  Exam reveals no gallop and no friction rub.    No murmur heard.  Pulmonary/Chest: Breath sounds normal. No respiratory distress. She has no wheezes. She has no rhonchi. She has no rales.   Abdominal: Soft. Bowel sounds are normal. There is no tenderness. There is no rebound and no guarding.   Musculoskeletal: Normal range of motion.   Neurological: She is alert.   Baseline confusion.    Skin: Skin is warm and dry.   Psychiatric: She has a normal mood and affect. Her behavior is normal. Judgment and thought content normal.       ED Course   Critical Care  Performed by: Julio Arceo MD  Authorized by: Julio Arceo MD   Direct patient critical care time: 14 minutes  Additional history critical care time: 10 minutes  Ordering / reviewing critical care time: 9 minutes  Documentation critical care time: 9 minutes  Consulting other physicians critical care time: 5 minutes  Total critical care time (exclusive of procedural time) : 47 minutes  Critical care was time spent personally by me on the following activities: development of treatment plan with patient or surrogate, examination of patient, ordering and performing treatments and interventions, obtaining history from patient or surrogate and ordering and review of laboratory studies.        Labs Reviewed   COMPREHENSIVE METABOLIC PANEL - Abnormal; Notable for the following components:       Result Value    Sodium 175 (*)     Chloride >130 (*)     Glucose 156 (*)     BUN, Bld 39 (*)     Albumin 3.0 (*)     AST 47 (*)     eGFR if  37 (*)     eGFR if non  32 (*)     All other components within normal limits    Narrative:       Na and Cl critical result(s) called and verbal readback obtained   from Dawood Lin , 08/16/2019 15:23   CBC W/ AUTO DIFFERENTIAL - Abnormal; Notable for the following components:    Hematocrit 52.4 (*)     Mean Corpuscular  Volume 101 (*)     Mean Corpuscular Hemoglobin Conc 28.2 (*)     RDW 15.0 (*)     Gran # (ANC) 8.1 (*)     Immature Grans (Abs) 0.09 (*)     All other components within normal limits   URINALYSIS, REFLEX TO URINE CULTURE - Abnormal; Notable for the following components:    Protein, UA 1+ (*)     Bilirubin (UA) 2+ (*)     Occult Blood UA Trace (*)     Urobilinogen, UA 4.0-6.0 (*)     Leukocytes, UA 1+ (*)     All other components within normal limits    Narrative:     Preferred Collection Type->Urine, Catheterized   URINALYSIS MICROSCOPIC - Abnormal; Notable for the following components:    WBC, UA 9 (*)     Bacteria Few (*)     All other components within normal limits    Narrative:     Preferred Collection Type->Urine, Catheterized        Imaging Results    None          Medical Decision Making:   History:   Old Medical Records: I decided to obtain old medical records.  Initial Assessment:   89-year-old female presented with multiple complaints.  Differential Diagnosis:   My differential diagnosis includes dehydration, UTI, electrolyte abnormality, and YOLI.   Clinical Tests:   Lab Tests: Reviewed and Ordered  ED Management:  The patient was emergently evaluated in the emergency department, her evaluation was significant for an elderly female with tachycardia and extremely dry mucous membranes.  The patient appears extremely dehydrated.  The patient's labs were significant for her dehydration, hypernatremia, and hyperchloremia.  The patient was treated with IV fluids in the emergency department.  The patient will be admitted the hospitalist service for further care.  The case was discussed with the hospitalist on call, Dr. Shelby.  She has accepted the patient for admission.            Scribe Attestation:   Scribe #1: I performed the above scribed service and the documentation accurately describes the services I performed. I attest to the accuracy of the note.    I, Dr. Julio Arceo, personally performed the  services described in this documentation. All medical record entries made by the scribe were at my direction and in my presence.  I have reviewed the chart and agree that the record reflects my personal performance and is accurate and complete. Julio Arceo MD.  4:20 PM 08/16/2019             Clinical Impression:       ICD-10-CM ICD-9-CM   1. Dehydration E86.0 276.51   2. Hypernatremia E87.0 276.0         Disposition:   Disposition: Admitted                        Julio Arceo MD  08/16/19 7326

## 2019-08-16 NOTE — ASSESSMENT & PLAN NOTE
The patient has underlying dementia but her mental status has gotten worse recently because of the dehydration and severe hypernatremia

## 2019-08-16 NOTE — HPI
The patient is a 89-year-old woman who has not been doing well since July when she started complaining of neck pain. She then stopped eating or drinking much and has lost possibly 20 lbs according to her family.  She has become extremely weak and is no longer able to walk without max assistance.  Her dementia has also gotten worse.    The patient is unable to provide any history because of her dementia but her daughter who is her primary caregiver was able to answer all my questions.  The patient has not been vomiting and has not had any diarrhea.  She has not complained of pain.  She has not fallen.  She does not take medications other than Zantac for GERD.

## 2019-08-16 NOTE — ASSESSMENT & PLAN NOTE
The patient has severe hypernatremia due to water deficiency.  She will be treated with D5W at 100 cc an hour and we will monitor her sodium closely.

## 2019-08-17 PROBLEM — E43 SEVERE MALNUTRITION: Status: ACTIVE | Noted: 2019-08-17

## 2019-08-17 LAB
ANION GAP SERPL CALC-SCNC: 11 MMOL/L (ref 8–16)
BUN SERPL-MCNC: 27 MG/DL (ref 10–30)
CALCIUM SERPL-MCNC: 9.5 MG/DL (ref 8.7–10.5)
CHLORIDE SERPL-SCNC: 124 MMOL/L (ref 95–110)
CO2 SERPL-SCNC: 23 MMOL/L (ref 23–29)
CREAT SERPL-MCNC: 1 MG/DL (ref 0.5–1.4)
EST. GFR  (AFRICAN AMERICAN): 56 ML/MIN/1.73 M^2
EST. GFR  (NON AFRICAN AMERICAN): 48 ML/MIN/1.73 M^2
GLUCOSE SERPL-MCNC: 148 MG/DL (ref 70–110)
POCT GLUCOSE: 107 MG/DL (ref 70–110)
POCT GLUCOSE: 150 MG/DL (ref 70–110)
POCT GLUCOSE: 240 MG/DL (ref 70–110)
POCT GLUCOSE: 98 MG/DL (ref 70–110)
POTASSIUM SERPL-SCNC: 3.4 MMOL/L (ref 3.5–5.1)
SODIUM SERPL-SCNC: 158 MMOL/L (ref 136–145)
T4 FREE SERPL-MCNC: 0.94 NG/DL (ref 0.71–1.51)
T4 FREE SERPL-MCNC: 0.95 NG/DL (ref 0.71–1.51)
TSH SERPL DL<=0.005 MIU/L-ACNC: 0.04 UIU/ML (ref 0.4–4)

## 2019-08-17 PROCEDURE — C9113 INJ PANTOPRAZOLE SODIUM, VIA: HCPCS | Performed by: INTERNAL MEDICINE

## 2019-08-17 PROCEDURE — 97802 MEDICAL NUTRITION INDIV IN: CPT

## 2019-08-17 PROCEDURE — G8978 MOBILITY CURRENT STATUS: HCPCS | Mod: CL | Performed by: PHYSICAL THERAPIST

## 2019-08-17 PROCEDURE — 94761 N-INVAS EAR/PLS OXIMETRY MLT: CPT

## 2019-08-17 PROCEDURE — 84439 ASSAY OF FREE THYROXINE: CPT | Mod: 91

## 2019-08-17 PROCEDURE — 80048 BASIC METABOLIC PNL TOTAL CA: CPT

## 2019-08-17 PROCEDURE — 97161 PT EVAL LOW COMPLEX 20 MIN: CPT | Performed by: PHYSICAL THERAPIST

## 2019-08-17 PROCEDURE — 25000003 PHARM REV CODE 250: Performed by: INTERNAL MEDICINE

## 2019-08-17 PROCEDURE — 63600175 PHARM REV CODE 636 W HCPCS: Performed by: INTERNAL MEDICINE

## 2019-08-17 PROCEDURE — G8979 MOBILITY GOAL STATUS: HCPCS | Mod: CK | Performed by: PHYSICAL THERAPIST

## 2019-08-17 PROCEDURE — 84443 ASSAY THYROID STIM HORMONE: CPT

## 2019-08-17 PROCEDURE — 97116 GAIT TRAINING THERAPY: CPT | Performed by: PHYSICAL THERAPIST

## 2019-08-17 PROCEDURE — 12000002 HC ACUTE/MED SURGE SEMI-PRIVATE ROOM

## 2019-08-17 PROCEDURE — 36415 COLL VENOUS BLD VENIPUNCTURE: CPT

## 2019-08-17 RX ORDER — DEXTROSE MONOHYDRATE, SODIUM CHLORIDE, AND POTASSIUM CHLORIDE 50; 1.49; 2.25 G/1000ML; G/1000ML; G/1000ML
INJECTION, SOLUTION INTRAVENOUS CONTINUOUS
Status: DISCONTINUED | OUTPATIENT
Start: 2019-08-17 | End: 2019-08-19 | Stop reason: HOSPADM

## 2019-08-17 RX ORDER — MIRTAZAPINE 7.5 MG/1
7.5 TABLET, FILM COATED ORAL NIGHTLY
Status: DISCONTINUED | OUTPATIENT
Start: 2019-08-17 | End: 2019-08-19 | Stop reason: HOSPADM

## 2019-08-17 RX ORDER — PANTOPRAZOLE SODIUM 40 MG/1
40 TABLET, DELAYED RELEASE ORAL 2 TIMES DAILY
Status: DISCONTINUED | OUTPATIENT
Start: 2019-08-17 | End: 2019-08-19 | Stop reason: HOSPADM

## 2019-08-17 RX ADMIN — HEPARIN SODIUM 5000 UNITS: 5000 INJECTION, SOLUTION INTRAVENOUS; SUBCUTANEOUS at 08:08

## 2019-08-17 RX ADMIN — DEXTROSE: 5 SOLUTION INTRAVENOUS at 03:08

## 2019-08-17 RX ADMIN — PANTOPRAZOLE SODIUM 40 MG: 40 TABLET, DELAYED RELEASE ORAL at 08:08

## 2019-08-17 RX ADMIN — DEXTROSE MONOHYDRATE, SODIUM CHLORIDE, AND POTASSIUM CHLORIDE: 50; 2.25; 1.49 INJECTION, SOLUTION INTRAVENOUS at 09:08

## 2019-08-17 RX ADMIN — MIRTAZAPINE 7.5 MG: 7.5 TABLET ORAL at 08:08

## 2019-08-17 RX ADMIN — DEXTROSE 8 MG/HR: 50 INJECTION, SOLUTION INTRAVENOUS at 03:08

## 2019-08-17 RX ADMIN — DEXTROSE MONOHYDRATE, SODIUM CHLORIDE, AND POTASSIUM CHLORIDE: 50; 2.25; 1.49 INJECTION, SOLUTION INTRAVENOUS at 08:08

## 2019-08-17 RX ADMIN — PANTOPRAZOLE SODIUM 40 MG: 40 TABLET, DELAYED RELEASE ORAL at 09:08

## 2019-08-17 RX ADMIN — SENNOSIDES, DOCUSATE SODIUM 1 TABLET: 50; 8.6 TABLET, FILM COATED ORAL at 08:08

## 2019-08-17 NOTE — PLAN OF CARE
Problem: Physical Therapy Goal  Goal: Physical Therapy Goal  Goals to be met by: 2019     Patient will increase functional independence with mobility by performin. Supine to sit with Moderate Assistance  2. Sit to stand transfer with Moderate Assistance  3. Gait  x 50 feet with Minimal Assistance using Rolling Walker.     Comments: Patient participating in PT as per plan of care. Patient ambulated 10 feet with FWW and min A

## 2019-08-17 NOTE — SUBJECTIVE & OBJECTIVE
Interval History:  The patient remains confused but her daughter states that the patient is more interactive than she has been in a while.    Review of Systems   Unable to perform ROS: Dementia     Objective:     Vital Signs (Most Recent):  Temp: 98.2 °F (36.8 °C) (08/17/19 1202)  Pulse: 73 (08/17/19 1202)  Resp: 18 (08/17/19 1202)  BP: 136/61 (08/17/19 1202)  SpO2: (!) 92 % (08/17/19 1202) Vital Signs (24h Range):  Temp:  [97.3 °F (36.3 °C)-98.8 °F (37.1 °C)] 98.2 °F (36.8 °C)  Pulse:  [] 73  Resp:  [14-20] 18  SpO2:  [92 %-99 %] 92 %  BP: (121-157)/(56-85) 136/61     Weight: 31.9 kg (70 lb 6.4 oz)  Body mass index is 14.71 kg/m².    Intake/Output Summary (Last 24 hours) at 8/17/2019 1225  Last data filed at 8/17/2019 0500  Gross per 24 hour   Intake 1136 ml   Output 345 ml   Net 791 ml      Physical Exam   Constitutional:   Bitemporal wasting   HENT:   Head: Normocephalic and atraumatic.   Eyes: Pupils are equal, round, and reactive to light. EOM are normal.   Neck: Normal range of motion. Neck supple. No JVD present.   Cardiovascular: Normal rate, regular rhythm and intact distal pulses.   Murmur heard.  Pulmonary/Chest: Effort normal and breath sounds normal. No respiratory distress. She has no rales.   Abdominal: Soft. Bowel sounds are normal. She exhibits no distension. There is no tenderness. There is no rebound.   Musculoskeletal: Normal range of motion. She exhibits no edema or deformity.   Neurological: She is alert. No cranial nerve deficit or sensory deficit.   The patient is confused and her speech is dysarthric   Skin: Skin is warm and dry. No rash noted.   Nursing note and vitals reviewed.      Significant Labs:   BMP:   Recent Labs   Lab 08/17/19  0621   *   *   K 3.4*   *   CO2 23   BUN 27   CREATININE 1.0   CALCIUM 9.5       Significant Imaging: I have reviewed all pertinent imaging results/findings within the past 24 hours.

## 2019-08-17 NOTE — ASSESSMENT & PLAN NOTE
Contributing Nutrition Diagnosis  Severe chronic illness related malnutrition     Related to (etiology):   Decreased appetite     Signs and Symptoms (as evidenced by):   1) PO intakes < 75% EEN x 1.5 months  2) 15% wt loss x 1.5 months  3) Severe muscle/fat wasting as charted below    Interventions/Recommendations (treatment strategy):  1) Nutrition supplements as above    Nutrition Diagnosis Status:   New

## 2019-08-17 NOTE — PROGRESS NOTES
[facility]  Progress Note    Patient Name: Gladys A Severin  MRN: [id number]  Patient Class: IP- Inpatient   Admission Date: 8/16/2019  Length of Stay: 1 days  Attending Physician: Brigitte Shelby MD  Primary Care Provider: Fany Majano DO        Subjective:     Principal Problem:Hypernatremia        HPI:  The patient is a 89-year-old woman who has not been doing well since July when she started complaining of neck pain. She then stopped eating or drinking much and has lost possibly 20 lbs according to her family.  She has become extremely weak and is no longer able to walk without max assistance.  Her dementia has also gotten worse.    The patient is unable to provide any history because of her dementia but her daughter who is her primary caregiver was able to answer all my questions.  The patient has not been vomiting and has not had any diarrhea.  She has not complained of pain.  She has not fallen.  She does not take medications other than Zantac for GERD.    Overview/Hospital Course:  No notes on file    Interval History:  The patient remains confused but her daughter states that the patient is more interactive than she has been in a while.    Review of Systems   Unable to perform ROS: Dementia     Objective:     Vital Signs (Most Recent):  Temp: 98.2 °F (36.8 °C) (08/17/19 1202)  Pulse: 73 (08/17/19 1202)  Resp: 18 (08/17/19 1202)  BP: 136/61 (08/17/19 1202)  SpO2: (!) 92 % (08/17/19 1202) Vital Signs (24h Range):  Temp:  [97.3 °F (36.3 °C)-98.8 °F (37.1 °C)] 98.2 °F (36.8 °C)  Pulse:  [] 73  Resp:  [14-20] 18  SpO2:  [92 %-99 %] 92 %  BP: (121-157)/(56-85) 136/61     Weight: 31.9 kg (70 lb 6.4 oz)  Body mass index is 14.71 kg/m².    Intake/Output Summary (Last 24 hours) at 8/17/2019 1225  Last data filed at 8/17/2019 0500  Gross per 24 hour   Intake 1136 ml   Output 345 ml   Net 791 ml      Physical Exam   Constitutional:   Bitemporal wasting   HENT:   Head:  Normocephalic and atraumatic.   Eyes: Pupils are equal, round, and reactive to light. EOM are normal.   Neck: Normal range of motion. Neck supple. No JVD present.   Cardiovascular: Normal rate, regular rhythm and intact distal pulses.   Murmur heard.  Pulmonary/Chest: Effort normal and breath sounds normal. No respiratory distress. She has no rales.   Abdominal: Soft. Bowel sounds are normal. She exhibits no distension. There is no tenderness. There is no rebound.   Musculoskeletal: Normal range of motion. She exhibits no edema or deformity.   Neurological: She is alert. No cranial nerve deficit or sensory deficit.   The patient is confused and her speech is dysarthric   Skin: Skin is warm and dry. No rash noted.   Nursing note and vitals reviewed.      Significant Labs:   BMP:   Recent Labs   Lab 08/17/19  0621   *   *   K 3.4*   *   CO2 23   BUN 27   CREATININE 1.0   CALCIUM 9.5       Significant Imaging: I have reviewed all pertinent imaging results/findings within the past 24 hours.      Assessment/Plan:      * Hypernatremia  Her sodium is improving with current treatment      Acute renal failure  Renal function is improving with rehydration      Hyperglycemia  The patient does not have a history of diabetes mellitus.  We will obtain Accu-Cheks q.a.c. and HS      Weight loss  Secondary to poor intake.  Unclear if it is her dementia or some other abnormality that caused her to lose her appetite.  TSH is low but free T4 is normal      Dementia with behavioral disturbance  The patient has underlying dementia but her mental status has gotten worse recently because of the dehydration and severe hypernatremia      GERD (gastroesophageal reflux disease)  Apparently her symptoms are significant according to the daughter.  I will give her Protonix         VTE Risk Mitigation (From admission, onward)        Ordered     heparin (porcine) injection 5,000 Units  Every 12 hours      08/16/19 4037     IP VTE  HIGH RISK PATIENT  Once      08/16/19 1730                Brigitte Shelby MD  Department of Hospital Medicine   [facility]

## 2019-08-17 NOTE — ASSESSMENT & PLAN NOTE
Secondary to poor intake.  Unclear if it is her dementia or some other abnormality that caused her to lose her appetite.  TSH is low but free T4 is normal

## 2019-08-17 NOTE — PLAN OF CARE
Problem: Malnutrition  Goal: Improved Nutritional Intake    Intervention: Promote and Optimize Oral Intake  Intervention: pureed diet and nutrition supplement therapy-commercial food/beverage     Recommendation:    1) Continue pureed diet encourage fluid intake   2) Add Boost Plus vanilla BID and boost pudding 1 x daily   3) Weigh pt weeky or as needed   4) Replete lytes, follow closely     Goals: 1) PO intakes > 50% of meals and supplements at f/u   Nutrition Goal Status: new  Communication of RD Recs: (POC, sticky note, second sign)

## 2019-08-17 NOTE — PLAN OF CARE
Problem: Adult Inpatient Plan of Care  Goal: Plan of Care Review  Outcome: Ongoing (interventions implemented as appropriate)  POC discussed with patient and family at bedside, voiced understanding. Patient with uneventful night, slept off and on between care. Incontinent of urine in brief earlier in shift, but up to [facility] with assist X 2, voided dark faustino urine. VS stable. No complaints voiced while lying in bed, but when repositioned or assisted up in bed, does complain of generalized discomfort. Hard to understand what patient says, mumbles at times but family usually understands. Tolerated sips of water, receiving IV fluids D5w @ 100cc/h and Protonix drip @ 20cc/h. Monitoring accuchecks, no coverage ordered, will continue to monitor. Bed alarm in use, family at bedside.

## 2019-08-17 NOTE — PT/OT/SLP EVAL
Physical Therapy Evaluation    Patient Name:  Gladys A Severin   MRN:  [id number]    Recommendations:     Discharge Recommendations:  nursing facility, skilled   Discharge Equipment Recommendations:     Barriers to discharge: None    Assessment:     Gladys A Severin is a 89 y.o. female admitted with a medical diagnosis of Hypernatremia.  She presents with the following impairments/functional limitations:  weakness, impaired endurance, impaired functional mobilty, gait instability .    Rehab Prognosis: Fair; patient would benefit from acute skilled PT services to address these deficits and reach maximum level of function.    Recent Surgery: * No surgery found *      Plan:     During this hospitalization, patient to be seen 6 x/week to address the identified rehab impairments via gait training, therapeutic activities, therapeutic exercises and progress toward the following goals:    · Plan of Care Expires:       Subjective     Chief Complaint: no complaints  Patient/Family Comments/goals:   Pain/Comfort:  ·      Patients cultural, spiritual, Mormonism conflicts given the current situation:      Living Environment:    Prior to admission, patients level of function was independent.  Equipment used at home:  .  DME owned (not currently used): rolling walker.  Upon discharge, patient will have assistance from family.    Objective:     Communicated with nursing prior to session.  Patient found supine with    upon PT entry to room.    General Precautions: Standard,     Orthopedic Precautions:    Braces:       Exams:  · RLE ROM: WFL  · RLE Strength: WFL  · LLE ROM: WFL  · LLE Strength: WFL    Functional Mobility:  · Bed Mobility:     · Supine to Sit: moderate assistance  · Transfers:     · Sit to Stand:  moderate assistance with rolling walker  · Gait: Patient ambulated 10 feet with FWW and min A for verbal cues with foot placement and AD advancement      Therapeutic Activities and Exercises:       AM-PAC 6 CLICK  MOBILITY  Total Score:11     Patient left supine with call button in reach.    GOALS:   Multidisciplinary Problems     Physical Therapy Goals        Problem: Physical Therapy Goal    Goal Priority Disciplines Outcome Goal Variances Interventions   Physical Therapy Goal     PT, PT/OT                      History:     Past Medical History:   Diagnosis Date    GERD (gastroesophageal reflux disease)        Past Surgical History:   Procedure Laterality Date    EYE SURGERY      HYSTERECTOMY      JOINT REPLACEMENT      LEG SURGERY Right        Time Tracking:     PT Received On: 08/17/19  PT Start Time: 0915     PT Stop Time: 0940  PT Total Time (min): 25 min     Billable Minutes: Evaluation 15 and Gait Training 10      Vincenzo Mckeon, PT  08/17/2019

## 2019-08-17 NOTE — CONSULTS
"  [facility]  Adult Nutrition  Consult Note    SUMMARY   Intervention: pureed diet and nutrition supplement therapy-commercial food/beverage    Recommendation:    1) Continue pureed diet encourage fluid intake   2) Add Boost Plus vanilla BID and boost pudding 1 x daily   3) Weigh pt weeky or as needed   4) Replete lytes, follow closely    Goals: 1) PO intakes > 50% of meals and supplements at f/u   Nutrition Goal Status: new  Communication of RD Recs: (POC, sticky note, second sign)    Reason for Assessment    Reason For Assessment: consult  Diagnosis: (hypernatremia)  Relevant Medical History: dementia, GERD  Interdisciplinary Rounds: did not attend    General Information Comments: 89 y/o female with dementia admitted with hypernatremia and ARF d/t poor PO intakes since July ~ 1.5 months per family ever since pt had an infection of the tongue. This has started to clear up family says and she is eating slightly better today. Took 7-8 bites of peas and mashed potatoes @ lunch. Soft food diet PTA, was taking foods such as sandwiches but having trouble with even that and sucking through a straw more recently. NFPE done 8/17/19, severe wasting seen. 15% significatn wt loss x 1 month per chart review.    Nutrition Discharge Planning: To be determined- Pureed diet + Boost Plus 2-3 x daily or boost pudding    Nutrition Risk Screen    Nutrition Risk Screen: dysphagia or difficulty swallowing    Nutrition/Diet History    Patient Reported Diet/Restrictions/Preferences: soft  Spiritual, Cultural Beliefs, Zoroastrian Practices, Values that Affect Care: no  Food Allergies: NKFA  Factors Affecting Nutritional Intake: decreased appetite, difficulty/impaired swallowing    Anthropometrics    Temp: 98.2 °F (36.8 °C)  Height Method: Measured  Height: 4' 10"  Height (inches): 58 in  Weight Method: Bed Scale  Weight: 31.9 kg (70 lb 5.2 oz)  Weight (lb): 70.33 lb  Ideal Body Weight (IBW), Female: 90 lb  % Ideal Body " Weight, Female (lb): 78.14 lb  BMI (Calculated): 14.7  BMI Grade: less than 16 protein-energy malnutrition grade III  Weight Loss: unintentional  Usual Body Weight (UBW), k.6 kg(19)  % Usual Body Weight: 85.02  % Weight Change From Usual Weight: -15.16 %       Lab/Procedures/Meds    Pertinent Labs Reviewed: reviewed  BMP  Lab Results   Component Value Date     (H) 2019    K 3.4 (L) 2019     (H) 2019    CO2 23 2019    BUN 27 2019    CREATININE 1.0 2019    CALCIUM 9.5 2019    ANIONGAP 11 2019    ESTGFRAFRICA 56 (A) 2019    EGFRNONAA 48 (A) 2019     Lab Results   Component Value Date    CALCIUM 9.5 2019    PHOS 3.0 2019     Lab Results   Component Value Date    ALBUMIN 3.0 (L) 2019     Pertinent Medications Reviewed: reviewed  Pertinent Medications Comments: senna, KCl, zofran    Estimated/Assessed Needs    Weight Used For Calorie Calculations: 31.9 kg (70 lb 5.2 oz)  Energy Calorie Requirements (kcal): [place] St Jeor ( x 1.5-1.6 wt gain) = 915-975 kcal  Energy Need Method: [place]-St Jeor  Protein Requirements: 0.9-1.0 g protein/kg ( ARF vs. severe wasting/age) = 28-31 g protein  Weight Used For Protein Calculations: 31.9 kg (70 lb 5.2 oz)  Fluid Requirements (mL): 1000 ml  Estimated Fluid Requirement Method: RDA Method  CHO Requirement: N/A      Nutrition Prescription Ordered    Current Diet Order: Pureed    Evaluation of Received Nutrient/Fluid Intake    Energy Calories Required: not meeting needs  Protein Required: not meeting needs  Fluid Required: not meeting needs  Tolerance: tolerating  % Intake of Estimated Energy Needs: 25%  % Meal Intake: 25%    Nutrition Risk    Level of Risk/Frequency of Follow-up: moderate 2 x weekly     Assessment and Plan    Severe malnutrition  Contributing Nutrition Diagnosis  Severe chronic illness related malnutrition     Related to (etiology):   Decreased appetite     Signs and  Symptoms (as evidenced by):   1) PO intakes < 75% EEN x 1.5 months  2) 15% wt loss x 1.5 months  3) Severe muscle/fat wasting as charted below    Interventions/Recommendations (treatment strategy):  1) Nutrition supplements as above    Nutrition Diagnosis Status:   New           Monitor and Evaluation    Food and Nutrient Intake: energy intake, food and beverage intake  Food and Nutrient Adminstration: diet order  Anthropometric Measurements: weight  Biochemical Data, Medical Tests and Procedures: electrolyte and renal panel  Nutrition-Focused Physical Findings: overall appearance, extremities, muscles and bones     Malnutrition Assessment  Malnutrition Type: chronic illness  Skin (Micronutrient): (Mamadou = 13, no PI noted)  Teeth (Micronutrient): (None)  Tongue (Micronutrient): other (see comments)(has a brown film over the surface ( was white per family recently))   Micronutrient Evaluation: suspected deficiency  Micronutrient Evaluation Comments: K ( may benefit from MVI in boost)   Weight Loss (Malnutrition): greater than 5% in 1 month  Energy Intake (Malnutrition): less than 75% for greater than or equal to 1 month   Orbital Region (Subcutaneous Fat Loss): severe depletion  Upper Arm Region (Subcutaneous Fat Loss): severe depletion  Thoracic and Lumbar Region: severe depletion   [place] Region (Muscle Loss): severe depletion  Clavicle Bone Region (Muscle Loss): severe depletion  Clavicle and Acromion Bone Region (Muscle Loss): severe depletion  Scapular Bone Region (Muscle Loss): severe depletion  Dorsal Hand (Muscle Loss): severe depletion  Patellar Region (Muscle Loss): severe depletion  Anterior Thigh Region (Muscle Loss): severe depletion  Posterior Calf Region (Muscle Loss): severe depletion   Edema (Fluid Accumulation): 0-->no edema present   Subcutaneous Fat Loss (Final Summary): severe protein-calorie malnutrition  Muscle Loss Evaluation (Final Summary): severe protein-calorie malnutrition    Severe  Weight Loss (Malnutrition): greater than 5% in 1 month    Nutrition Follow-Up    RD Follow-up?: Yes

## 2019-08-17 NOTE — PLAN OF CARE
Problem: Adult Inpatient Plan of Care  Goal: Patient-Specific Goal (Individualization)  Outcome: Ongoing (interventions implemented as appropriate)  Pt is demented. POC reviewed with family. Family verbalized understanding. Pureed diet with boost initiated. Pt tolerating well. Monitoring blood glucose as pt is on dextrose infusion. VSS. Remains afebrile. IVF infusing per order. IV abx infused per order.  t remains free of pain and injury. Bed low, breaks locked, call light in reach. Will monitor.

## 2019-08-18 LAB
ANION GAP SERPL CALC-SCNC: 7 MMOL/L (ref 8–16)
BUN SERPL-MCNC: 15 MG/DL (ref 10–30)
CALCIUM SERPL-MCNC: 9.7 MG/DL (ref 8.7–10.5)
CHLORIDE SERPL-SCNC: 118 MMOL/L (ref 95–110)
CO2 SERPL-SCNC: 24 MMOL/L (ref 23–29)
CREAT SERPL-MCNC: 0.8 MG/DL (ref 0.5–1.4)
EST. GFR  (AFRICAN AMERICAN): >60 ML/MIN/1.73 M^2
EST. GFR  (NON AFRICAN AMERICAN): >60 ML/MIN/1.73 M^2
GLUCOSE SERPL-MCNC: 98 MG/DL (ref 70–110)
POCT GLUCOSE: 113 MG/DL (ref 70–110)
POCT GLUCOSE: 138 MG/DL (ref 70–110)
POCT GLUCOSE: 94 MG/DL (ref 70–110)
POTASSIUM SERPL-SCNC: 4 MMOL/L (ref 3.5–5.1)
SODIUM SERPL-SCNC: 149 MMOL/L (ref 136–145)

## 2019-08-18 PROCEDURE — 94761 N-INVAS EAR/PLS OXIMETRY MLT: CPT

## 2019-08-18 PROCEDURE — G8987 SELF CARE CURRENT STATUS: HCPCS | Mod: CN

## 2019-08-18 PROCEDURE — 97530 THERAPEUTIC ACTIVITIES: CPT

## 2019-08-18 PROCEDURE — 63600175 PHARM REV CODE 636 W HCPCS: Performed by: INTERNAL MEDICINE

## 2019-08-18 PROCEDURE — 97535 SELF CARE MNGMENT TRAINING: CPT

## 2019-08-18 PROCEDURE — 97166 OT EVAL MOD COMPLEX 45 MIN: CPT

## 2019-08-18 PROCEDURE — 12000002 HC ACUTE/MED SURGE SEMI-PRIVATE ROOM

## 2019-08-18 PROCEDURE — 36415 COLL VENOUS BLD VENIPUNCTURE: CPT

## 2019-08-18 PROCEDURE — 25000003 PHARM REV CODE 250: Performed by: INTERNAL MEDICINE

## 2019-08-18 PROCEDURE — 80048 BASIC METABOLIC PNL TOTAL CA: CPT

## 2019-08-18 PROCEDURE — G8989 SELF CARE D/C STATUS: HCPCS | Mod: CN

## 2019-08-18 PROCEDURE — G8988 SELF CARE GOAL STATUS: HCPCS | Mod: CN

## 2019-08-18 RX ORDER — PANTOPRAZOLE SODIUM 40 MG/1
40 TABLET, DELAYED RELEASE ORAL DAILY
Qty: 30 TABLET | Refills: 0 | OUTPATIENT
Start: 2019-08-18 | End: 2019-08-19

## 2019-08-18 RX ORDER — ACETAMINOPHEN 500 MG
1000 TABLET ORAL 3 TIMES DAILY
Status: DISCONTINUED | OUTPATIENT
Start: 2019-08-18 | End: 2019-08-19 | Stop reason: HOSPADM

## 2019-08-18 RX ORDER — MIRTAZAPINE 7.5 MG/1
7.5 TABLET, FILM COATED ORAL NIGHTLY
Qty: 30 TABLET | Refills: 11 | OUTPATIENT
Start: 2019-08-18 | End: 2019-08-19 | Stop reason: HOSPADM

## 2019-08-18 RX ORDER — ACETAMINOPHEN 500 MG
1000 TABLET ORAL EVERY 6 HOURS PRN
Refills: 0 | COMMUNITY
Start: 2019-08-18 | End: 2019-08-19 | Stop reason: HOSPADM

## 2019-08-18 RX ADMIN — DEXTROSE MONOHYDRATE, SODIUM CHLORIDE, AND POTASSIUM CHLORIDE: 50; 2.25; 1.49 INJECTION, SOLUTION INTRAVENOUS at 08:08

## 2019-08-18 RX ADMIN — PANTOPRAZOLE SODIUM 40 MG: 40 TABLET, DELAYED RELEASE ORAL at 09:08

## 2019-08-18 RX ADMIN — MIRTAZAPINE 7.5 MG: 7.5 TABLET ORAL at 09:08

## 2019-08-18 RX ADMIN — PANTOPRAZOLE SODIUM 40 MG: 40 TABLET, DELAYED RELEASE ORAL at 08:08

## 2019-08-18 RX ADMIN — HEPARIN SODIUM 5000 UNITS: 5000 INJECTION, SOLUTION INTRAVENOUS; SUBCUTANEOUS at 09:08

## 2019-08-18 RX ADMIN — HEPARIN SODIUM 5000 UNITS: 5000 INJECTION, SOLUTION INTRAVENOUS; SUBCUTANEOUS at 08:08

## 2019-08-18 RX ADMIN — ACETAMINOPHEN 1000 MG: 500 TABLET ORAL at 09:08

## 2019-08-18 RX ADMIN — SENNOSIDES, DOCUSATE SODIUM 1 TABLET: 50; 8.6 TABLET, FILM COATED ORAL at 08:08

## 2019-08-18 NOTE — SUBJECTIVE & OBJECTIVE
Interval History:  The patient is anxious this morning.  She is very confused    Review of Systems   Unable to perform ROS: Dementia     Objective:     Vital Signs (Most Recent):  Temp: 97.4 °F (36.3 °C) (08/18/19 0735)  Pulse: 72 (08/18/19 0735)  Resp: 16 (08/18/19 0735)  BP: (!) 122/57 (08/18/19 0735)  SpO2: 97 % (08/18/19 0751) Vital Signs (24h Range):  Temp:  [97.4 °F (36.3 °C)-99.7 °F (37.6 °C)] 97.4 °F (36.3 °C)  Pulse:  [70-84] 72  Resp:  [16-18] 16  SpO2:  [89 %-99 %] 97 %  BP: (115-136)/(57-95) 122/57     Weight: 31.9 kg (70 lb 5.2 oz)  Body mass index is 14.7 kg/m².    Intake/Output Summary (Last 24 hours) at 8/18/2019 1117  Last data filed at 8/18/2019 0600  Gross per 24 hour   Intake 1350 ml   Output --   Net 1350 ml      Physical Exam   Constitutional:   Bitemporal wasting   HENT:   Head: Normocephalic and atraumatic.   Eyes: Pupils are equal, round, and reactive to light. EOM are normal.   Neck: Normal range of motion. Neck supple. No JVD present.   Cardiovascular: Normal rate, regular rhythm and intact distal pulses.   Murmur heard.  Pulmonary/Chest: Effort normal and breath sounds normal. No respiratory distress. She has no rales.   Abdominal: Soft. Bowel sounds are normal. She exhibits no distension. There is no tenderness. There is no rebound.   Musculoskeletal: Normal range of motion. She exhibits no edema or deformity.   Neurological: She is alert. No cranial nerve deficit or sensory deficit.   The patient is confused and her speech is dysarthric   Skin: Skin is warm and dry. No rash noted.   Nursing note and vitals reviewed.      Significant Labs:   BMP:   Recent Labs   Lab 08/18/19  0621   GLU 98   *   K 4.0   *   CO2 24   BUN 15   CREATININE 0.8   CALCIUM 9.7       Significant Imaging: I have reviewed all pertinent imaging results/findings within the past 24 hours.

## 2019-08-18 NOTE — PLAN OF CARE
Problem: Occupational Therapy Goal  Goal: Occupational Therapy Goal  Goals to be met by: 8/25/19     Patient will increase functional independence with ADLs by performing:    Feeding with Set-up Assistance, Moderate Assistance and Assistive Devices as needed.  Grooming while seated with Set-up Assistance, Moderate Assistance and Assistive Devices as needed.    Outcome: Ongoing (interventions implemented as appropriate)  OT evaluation completed today. Goals & care plan established.    MONO Ryan  8/18/2019

## 2019-08-18 NOTE — PLAN OF CARE
Problem: Adult Inpatient Plan of Care  Goal: Plan of Care Review  Outcome: Ongoing (interventions implemented as appropriate)  Pt is demented. VSS. Plan of care reviewed with pt's daughter at beginning of shift.  Daughter verbalized understanding. Daughter states pt is not in pain, pt also shows no signs of pain. Glucose monitoring in progress since pt is on dextrose infusion. Pt's daughter to remain at bedside throughout night. Hourly/ Q2 hourly rounds completed on this pt throughout shift.  Pain monitored, restroom offered, safety maintained.  Patient has remained free from fall/injury, no skin breakdown noted.  Side rails up x2, bed in locked and lowest position, call light kept within reach.  Needs attended to, will continue to monitor/ update as indicated.

## 2019-08-18 NOTE — PLAN OF CARE
08/18/19 0751   PRE-TX-O2   O2 Device (Oxygen Therapy) room air   SpO2 97 %   Pulse Oximetry Type Intermittent   $ Pulse Oximetry - Multiple Charge Pulse Oximetry - Multiple

## 2019-08-18 NOTE — PROGRESS NOTES
[facility] Hospital Medicine  Progress Note    Patient Name: Gladys A Severin  MRN: [id number]  Patient Class: IP- Inpatient   Admission Date: 8/16/2019  Length of Stay: 2 days  Attending Physician: Brigitte Shelby MD  Primary Care Provider: Fany Majano DO        Subjective:     Principal Problem:Hypernatremia        HPI:  The patient is a 89-year-old woman who has not been doing well since July when she started complaining of neck pain. She then stopped eating or drinking much and has lost possibly 20 lbs according to her family.  She has become extremely weak and is no longer able to walk without max assistance.  Her dementia has also gotten worse.    The patient is unable to provide any history because of her dementia but her daughter who is her primary caregiver was able to answer all my questions.  The patient has not been vomiting and has not had any diarrhea.  She has not complained of pain.  She has not fallen.  She does not take medications other than Zantac for GERD.    Overview/Hospital Course:  No notes on file    Interval History:  The patient is anxious this morning.  She is very confused    Review of Systems   Unable to perform ROS: Dementia     Objective:     Vital Signs (Most Recent):  Temp: 97.4 °F (36.3 °C) (08/18/19 0735)  Pulse: 72 (08/18/19 0735)  Resp: 16 (08/18/19 0735)  BP: (!) 122/57 (08/18/19 0735)  SpO2: 97 % (08/18/19 0751) Vital Signs (24h Range):  Temp:  [97.4 °F (36.3 °C)-99.7 °F (37.6 °C)] 97.4 °F (36.3 °C)  Pulse:  [70-84] 72  Resp:  [16-18] 16  SpO2:  [89 %-99 %] 97 %  BP: (115-136)/(57-95) 122/57     Weight: 31.9 kg (70 lb 5.2 oz)  Body mass index is 14.7 kg/m².    Intake/Output Summary (Last 24 hours) at 8/18/2019 1117  Last data filed at 8/18/2019 0600  Gross per 24 hour   Intake 1350 ml   Output --   Net 1350 ml      Physical Exam   Constitutional:   Bitemporal wasting   HENT:   Head: Normocephalic and atraumatic.   Eyes: Pupils are equal, round, and  reactive to light. EOM are normal.   Neck: Normal range of motion. Neck supple. No JVD present.   Cardiovascular: Normal rate, regular rhythm and intact distal pulses.   Murmur heard.  Pulmonary/Chest: Effort normal and breath sounds normal. No respiratory distress. She has no rales.   Abdominal: Soft. Bowel sounds are normal. She exhibits no distension. There is no tenderness. There is no rebound.   Musculoskeletal: Normal range of motion. She exhibits no edema or deformity.   Neurological: She is alert. No cranial nerve deficit or sensory deficit.   The patient is confused and her speech is dysarthric   Skin: Skin is warm and dry. No rash noted.   Nursing note and vitals reviewed.      Significant Labs:   BMP:   Recent Labs   Lab 08/18/19  0621   GLU 98   *   K 4.0   *   CO2 24   BUN 15   CREATININE 0.8   CALCIUM 9.7       Significant Imaging: I have reviewed all pertinent imaging results/findings within the past 24 hours.      Assessment/Plan:      * Hypernatremia  Her sodium is improving with current treatment      Acute renal failure  Renal function is improving with rehydration      Hyperglycemia  The patient does not have a history of diabetes mellitus.  We will obtain Accu-Cheks q.a.c. and HS      Weight loss  Secondary to poor intake.  Unclear if it is her dementia or some other abnormality that caused her to lose her appetite.  TSH is low but free T4 is normal      Dementia with behavioral disturbance  The patient has underlying dementia but her mental status has gotten worse recently because of the dehydration and severe hypernatremia      GERD (gastroesophageal reflux disease)  Apparently her symptoms are significant according to the daughter.  I will give her Protonix       Severe malnutrition  The patient is eating better in the hospital than she had been at home       Disposition:  The patient will be discharged home with her family tomorrow    VTE Risk Mitigation (From admission, onward)         Ordered     heparin (porcine) injection 5,000 Units  Every 12 hours      08/16/19 1733     IP VTE HIGH RISK PATIENT  Once      08/16/19 1733                Brigitte Shelby MD  Department of Hospital Medicine   [facility]

## 2019-08-18 NOTE — PROGRESS NOTES
SW met with patient, daughter and son-in-law to complete a discharge planning assessment. Patient presents as disoriented, confused and has dementia. Caregiver presents as alert and oriented x 4, pleasant, good eye contact, well groomed, recall good, concentration/judgement good, average intelligence, calm, communicative, cooperative and asking and answering questions appropriately. SW verified all information on demographic sheet is correct. Caregiver reports pt lives with daughter and son-in-law and that she is not independent with ADLs at this time and does not drive. Caregiver reports she will transport pt home.    Caregiver reports pt does not have home health. Pt's daughter requesting home health services. Caregiver reports that she is not receiving outside resources.  Caregiver reports pt has Rollator, bedside commode, and a wheelchair. Pt's daughter report PT is recommending a rolling walker. Caregiver reports Fany Majano DO as pt's PCP and has [employer] as their insurance. Caregiver reports pt receives medications from [facility] and reports that she is able to afford medications at this time and at time of discharge. Caregiver reports that she is not on dialysis at this time.  Caregiver reports that she is not receiving services with the coumadin clinic. Caregiver reports pt has not been admitted to the hospital within the last 30 days.    Caregiver reports pt does not have a Living Will or Healthcare Power of . Caregiver indicates pt has mental health issues. Pt has been diagnises with dementia    Caregiver is requesting home health and a rolling walker as recommended by PT. Pt's daughter is also requesting pt discharge home via ambulance if possible due to pt needing significant assistance getting in and out of the car. SW remains available.       08/18/19 6155   Discharge Assessment   Assessment Type Discharge Planning Assessment   Confirmed/corrected address and phone  number on facesheet? Yes   Assessment information obtained from? Patient;Caregiver   Prior to hospitilization cognitive status: Not Oriented to Person;Not Oriented to Place   Prior to hospitalization functional status: Completely Dependent   Current cognitive status: Not Oriented to Place;Not Oriented to Person   Current Functional Status: Completely Dependent   Lives With child(kimmy), adult   Able to Return to Prior Arrangements yes   Is patient able to care for self after discharge? No   Who are your caregiver(s) and their phone number(s)? Neha Thompson (daughter) [phone]   Patient's perception of discharge disposition home health  (pt currently residing with daughter)   Readmission Within the Last 30 Days no previous admission in last 30 days   Patient currently being followed by outpatient case management? No   Patient currently receives any other outside agency services? No   Equipment Currently Used at Home bedside commode;wheelchair;rollator   Do you have any problems affording any of your prescribed medications? No   Is the patient taking medications as prescribed? yes   Does the patient have transportation home? Yes   Transportation Anticipated family or friend will provide  (Pt's family asking if pt appropriate to travel home in an ambulance)   Dialysis Name and Scheduled days N/A   Does the patient receive services at the Coumadin Clinic? No   Discharge Plan A Home with family   DME Needed Upon Discharge  walker, rolling   Patient/Family in Agreement with Plan yes

## 2019-08-18 NOTE — PLAN OF CARE
Problem: Adult Inpatient Plan of Care  Goal: Patient-Specific Goal (Individualization)  Outcome: Ongoing (interventions implemented as appropriate)  Pt is alert but demented. POC reviewed with family.  Family verbalized understanding. Pt does become restless and agitated when about to have a bowel movement or urinate.  VSS. Remains afebrile. IVF infusing per order. Monitoring glucose due to IVF.  Pt remains free of pain and injury. Bed low, breaks locked, call light in reach. Will monitor.

## 2019-08-19 VITALS
BODY MASS INDEX: 14.76 KG/M2 | WEIGHT: 70.31 LBS | DIASTOLIC BLOOD PRESSURE: 57 MMHG | HEIGHT: 58 IN | OXYGEN SATURATION: 97 % | SYSTOLIC BLOOD PRESSURE: 99 MMHG | TEMPERATURE: 97 F | HEART RATE: 71 BPM | RESPIRATION RATE: 15 BRPM

## 2019-08-19 LAB
ANION GAP SERPL CALC-SCNC: 9 MMOL/L (ref 8–16)
BUN SERPL-MCNC: 11 MG/DL (ref 10–30)
CALCIUM SERPL-MCNC: 9.4 MG/DL (ref 8.7–10.5)
CHLORIDE SERPL-SCNC: 115 MMOL/L (ref 95–110)
CO2 SERPL-SCNC: 22 MMOL/L (ref 23–29)
CREAT SERPL-MCNC: 0.9 MG/DL (ref 0.5–1.4)
EST. GFR  (AFRICAN AMERICAN): >60 ML/MIN/1.73 M^2
EST. GFR  (NON AFRICAN AMERICAN): 55 ML/MIN/1.73 M^2
GLUCOSE SERPL-MCNC: 79 MG/DL (ref 70–110)
POCT GLUCOSE: 110 MG/DL (ref 70–110)
POCT GLUCOSE: 92 MG/DL (ref 70–110)
POTASSIUM SERPL-SCNC: 4.4 MMOL/L (ref 3.5–5.1)
SODIUM SERPL-SCNC: 146 MMOL/L (ref 136–145)

## 2019-08-19 PROCEDURE — 25000003 PHARM REV CODE 250: Performed by: INTERNAL MEDICINE

## 2019-08-19 PROCEDURE — 94761 N-INVAS EAR/PLS OXIMETRY MLT: CPT

## 2019-08-19 PROCEDURE — 36415 COLL VENOUS BLD VENIPUNCTURE: CPT

## 2019-08-19 PROCEDURE — 80048 BASIC METABOLIC PNL TOTAL CA: CPT

## 2019-08-19 PROCEDURE — 63600175 PHARM REV CODE 636 W HCPCS: Performed by: INTERNAL MEDICINE

## 2019-08-19 RX ORDER — ACETAMINOPHEN 500 MG
1000 TABLET ORAL 3 TIMES DAILY
Refills: 0 | COMMUNITY
Start: 2019-08-19

## 2019-08-19 RX ORDER — PANTOPRAZOLE SODIUM 40 MG/1
40 TABLET, DELAYED RELEASE ORAL DAILY
Qty: 30 TABLET | Refills: 0 | Status: SHIPPED | OUTPATIENT
Start: 2019-08-19 | End: 2019-09-18

## 2019-08-19 RX ADMIN — PANTOPRAZOLE SODIUM 40 MG: 40 TABLET, DELAYED RELEASE ORAL at 09:08

## 2019-08-19 RX ADMIN — ACETAMINOPHEN 1000 MG: 500 TABLET ORAL at 09:08

## 2019-08-19 RX ADMIN — HEPARIN SODIUM 5000 UNITS: 5000 INJECTION, SOLUTION INTRAVENOUS; SUBCUTANEOUS at 09:08

## 2019-08-19 NOTE — PLAN OF CARE
Yo received a call from Elaina with [employer], the dme was sent to [facility] and they will delivery the walker to the patient's home.  Yo notified MADI Roca.       08/19/19 6052   Post-Acute Status   Post-Acute Authorization [facility] Status Set-up Complete

## 2019-08-19 NOTE — PLAN OF CARE
08/19/19 1433   Final Note   Assessment Type Final Discharge Note   Anticipated Discharge Disposition Home-Health  ([facility])   Hospital Follow Up  Appt(s) scheduled? Yes

## 2019-08-19 NOTE — PT/OT/SLP PROGRESS
Physical Therapy      Patient Name:  Gladys A Severin   MRN:  [id number]    Patient not seen today secondary to Other (Comment)(Patient too lethargic per family member at bedside in a.m.  Second attempt, being fed and preparing for discharge in p.m.). Will follow-up 8/20/19.    Delaney Osorio PTA

## 2019-08-19 NOTE — PLAN OF CARE
Problem: Adult Inpatient Plan of Care  Goal: Plan of Care Review  Outcome: Ongoing (interventions implemented as appropriate)  Plan of care reviewed with patient and pts daughter. Pt with a history of dementia unable to verbalize understanding. Pts daughter agreeable with pts plan of care. Pt alert oriented to person only. PIV intact.  No complaints or signs of pain throughout shift. Blood glucose monitored Pt with BM x 2. Incontinent care provided. SR up x 2, bed in lowest position, call light in reach. Will continue to monitor.

## 2019-08-19 NOTE — HOSPITAL COURSE
The patient is a 89-year-old woman with dementia who is brought in by her family because of progressive weakness, poor oral intake and lethargy.  Her evaluation in the emergency room reveals severe hypernatremia.    The patient was admitted and treated with D5W.  Her mental status improved and her sodium is back to normal.  She is eating and drinking a little more than she was at home.    I spoke frankly with her daughter who is her primary caregiver and told her that the patient may not be able to eat or drink enough  on her own at home and she will have recurrent problems with her electrolytes and dehydration.  This is due to her significant dementia.    The patient will be discharged home with home health services.    She is extremely weak and will need to go home by ambulance because she will not be able to get into a car.

## 2019-08-19 NOTE — PLAN OF CARE
Cm sent referral for HH to N via [facility] care. Pending HH.  Also, pending authorization for ambulance.  Cm sent Discharge Summary to get ambulance authorization.  Daughter signed the Pt's Choice Disclosure Form.     08/19/19 1037   Post-Acute Status   Post-Acute Authorization Home Health/Hospice   Home Health/Hospice Status Referrals Sent

## 2019-08-19 NOTE — PLAN OF CARE
Yo received a call from Elaina with [employer].  She informed me that pt is set up with [facility] and the Ambulance authorization is # [id number].  Yo informed  MADI Roca.       08/19/19 1720   Post-Acute Status   Post-Acute Authorization Home Health/Hospice   HME Status Set-up Complete

## 2019-08-19 NOTE — PLAN OF CARE
Cm sent referral for hospital bed via right care to [facility].  Yo spoke with Bull at [facility] and she will take care of it tomorrow for delivery with Ally.       08/19/19 8374   Post-Acute Status   Post-Acute Authorization E   [facility] Status Referrals Sent

## 2019-08-20 ENCOUNTER — TELEPHONE (OUTPATIENT)
Dept: MEDSURG UNIT | Facility: HOSPITAL | Age: 84
End: 2019-08-20

## 2019-08-21 ENCOUNTER — PATIENT OUTREACH (OUTPATIENT)
Dept: ADMINISTRATIVE | Facility: CLINIC | Age: 84
End: 2019-08-21

## 2019-10-14 NOTE — NURSING
1745 Pt in room and settled, admit done by MADI Card. Got report from Stephie and assumed care of pt    1800 Got report from MADI Card, introduced myself, continued to release and start orders on pt. Spoke with family at bedside and oriented to room.  
Pt confused, family at bedside. PIV CDI/Infusing as ordered. Pt safe and free of injury while in my care. Bed locked and low, call light in reach. Handoff report given to MADI Peterson. Will continue to monitor.  
Pt discharged to home. Discharge instructions given to pt/daughter. Pt verbalized understanding. IV removed without difficulty.  Ambulance/stretcher arrived to transport. Nurse relinquished care.  
Pt to room 303 bed B her baseline is confusion and family is at bedside for admit questions.  She is awake and alert and will talk and answer some questions for example her name.  Skin upon assessment is intact heels and sacral/coccyx area all intact.  She is on room air in no distress, she seems anxious pulling on covers ex. IV present.   
Hypotension

## 2021-10-16 NOTE — PROGRESS NOTES
C3 nurse attempted to contact patient. No answer. The following message was left for the patient to return the call:  Good morning I am a nurse calling on behalf of [facility] from the [facility].  This is a Transitional Care Call for Gladys A Severin. When you have a moment please contact us at [phone] or [phone] Monday through Friday, between the hours of 8 am to 4 pm. We look forward to speaking with you. On behalf of [facility] have a nice day.    The patient does not have a scheduled HOSFU appointment within 7-14 days post hospital discharge date 8/19/19. Message sent to Physician staff to assist with HOSFU appointment scheduling.       Simple: Patient demonstrates quick and easy understanding

## 2022-10-11 NOTE — PT/OT/SLP EVAL
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 diabetes.  Her renal function improved after well hydration.  No urologic problems noted.  She can follow up as needed at this time.  No additional work up needed for bilateral renal cysts.    No problem with urination reported and she is doing well.  I spent 25 minutes with the patient of which more than half was spent in direct consultation with the patient in regards to our treatment and plan.      Follow-up:  Follow up if symptoms worsen or fail to improve.

## 2024-11-09 NOTE — DISCHARGE SUMMARY
[facility] Hospital Medicine  Discharge Summary      Patient Name: Gladys A Severin  MRN: [id number]  Admission Date: 8/16/2019  Hospital Length of Stay: 3 days  Discharge Date and Time:  08/19/2019 10:12 AM  Attending Physician: Brigitte Shelby MD   Discharging Provider: Brigitte Shelby MD  Primary Care Provider: Fany Majano DO      HPI:   The patient is a 89-year-old woman who has not been doing well since July when she started complaining of neck pain. She then stopped eating or drinking much and has lost possibly 20 lbs according to her family.  She has become extremely weak and is no longer able to walk without max assistance.  Her dementia has also gotten worse.    The patient is unable to provide any history because of her dementia but her daughter who is her primary caregiver was able to answer all my questions.  The patient has not been vomiting and has not had any diarrhea.  She has not complained of pain.  She has not fallen.  She does not take medications other than Zantac for GERD.    * No surgery found *      Hospital Course:   The patient is a 89-year-old woman with dementia who is brought in by her family because of progressive weakness, poor oral intake and lethargy.  Her evaluation in the emergency room reveals severe hypernatremia.    The patient was admitted and treated with D5W.  Her mental status improved and her sodium is back to normal.  She is eating and drinking a little more than she was at home.    I spoke frankly with her daughter who is her primary caregiver and told her that the patient may not be able to eat or drink enough  on her own at home and she will have recurrent problems with her electrolytes and dehydration.  This is due to her significant dementia.    The patient will be discharged home with home health services.    She is extremely weak and will need to go home by ambulance because she will not be able to get into a car.     Consults:   Consults  "(From admission, onward)        Status Ordering Provider     IP consult to dietary  Once     Provider:  (Not yet assigned)    Completed DELIA MARRUFO          No new Assessment & Plan notes have been filed under this hospital service since the last note was generated.  Service: Hospital Medicine    Final Active Diagnoses:    Diagnosis Date Noted POA    PRINCIPAL PROBLEM:  Hypernatremia [E87.0] 08/16/2019 Yes    Acute renal failure [N17.9] 08/16/2019 Yes    Hyperglycemia [R73.9] 08/16/2019 Yes    Weight loss [R63.4] 08/16/2019 Yes    Dementia with behavioral disturbance [F03.91] 08/16/2019 Yes    GERD (gastroesophageal reflux disease) [K21.9] 08/16/2019 Yes    Severe malnutrition [E43] 08/17/2019 Yes      Problems Resolved During this Admission:       Discharged Condition: fair    [place] [facility]    [place]:  Follow-up Information     Fany Majano DO In 1 week.    Specialty:  Family Medicine  Contact information:  [address] [facility] [id number] [phone]             Fany Majano DO.    Specialty:  Family Medicine  Why:  As needed  Contact information:  [address] [facility] [id number] [phone]                 Patient Instructions:      WALKER FOR HOME USE     Order Specific Question Answer Comments   Type of Walker: Adult (5'4"-6'6")    With wheels? Yes    Height: 4' 10" (1.473 m)    Weight: 31.9 kg (70 lb 5.2 oz)    Length of need (1-99 months): 99    Does patient have medical equipment at home? bedside commode    Does patient have medical equipment at home? wheelchair    Does patient have medical equipment at home? rollator    Please check all that apply: Patient's condition impairs ambulation.    Please check all that apply: Patient is unable to safely ambulate without equipment.    Please check all that apply: Patient needs help to get in and out of chair.    Please check all that apply: Walker will be used " for gait training.      Referral to Home health   Referral Priority: Routine Referral Type: Home Health   Referral Reason: Specialty Services Required   Requested Specialty: Home Health Services   Number of Visits Requested: 1     Diet Adult Regular     Order Specific Question Answer Comments   Additional restrictions: Pureed      Activity as tolerated       Significant Diagnostic Studies: Labs:   BMP:   Recent Labs   Lab 08/18/19  0621 08/19/19  0559   GLU 98 79   * 146*   K 4.0 4.4   * 115*   CO2 24 22*   BUN 15 11   CREATININE 0.8 0.9   CALCIUM 9.7 9.4       Pending Diagnostic Studies:     None         Medications:  Reconciled Home Medications:      Medication List      START taking these medications    acetaminophen 500 MG tablet  Commonly known as:  TYLENOL  Take 2 tablets (1,000 mg total) by mouth 3 (three) times daily.     pantoprazole 40 MG tablet  Commonly known as:  PROTONIX  Take 1 tablet (40 mg total) by mouth once daily.        CHANGE how you take these medications    ondansetron 8 MG tablet  Commonly known as:  ZOFRAN  Take 1 tablet (8 mg total) by mouth every 12 (twelve) hours as needed for Nausea.  What changed:  additional instructions        CONTINUE taking these medications    albuterol-ipratropium 2.5 mg-0.5 mg/3 mL nebulizer solution  Commonly known as:  DUO-NEB  Take 3 mLs by nebulization every 6 (six) hours as needed for Wheezing or Shortness of Breath. Rescue     donepezil 10 MG tablet  Commonly known as:  ARICEPT  Take 10 mg by mouth every evening.     lactulose 10 gram/15 mL solution  Commonly known as:  CHRONULAC  Take 10 g by mouth daily as needed (constipation).        STOP taking these medications    ranitidine 15 mg/mL syrup  Commonly known as:  ZANTAC            Indwelling Lines/Drains at time of discharge:   Lines/Drains/Airways          None          Time spent on the discharge of patient: 30 minutes  Patient was seen and examined on the date of discharge and determined  to be suitable for discharge.         Brigitte Shelby MD  Department of Hospital Medicine  [facility]   BMI: BMI (kg/m2): 26.3 (11-08-24 @ 22:01)  HbA1c: A1C with Estimated Average Glucose Result: 10.6 % (11-09-24 @ 08:00)    Glucose: POCT Blood Glucose.: 229 mg/dL (11-09-24 @ 12:16)    BP: --Vital Signs Last 24 Hrs  T(C): 36.4 (11-09-24 @ 08:11), Max: 36.9 (11-08-24 @ 15:15)  T(F): 97.5 (11-09-24 @ 08:11), Max: 98.4 (11-08-24 @ 15:15)  HR: 87 (11-08-24 @ 15:15) (87 - 87)  BP: 124/76 (11-08-24 @ 15:15) (124/76 - 124/76)  BP(mean): --  RR: 18 (11-08-24 @ 22:01) (18 - 18)  SpO2: 98% (11-08-24 @ 22:01) (98% - 98%)    Orthostatic VS  11-09-24 @ 08:11  Lying BP: --/-- HR: --  Sitting BP: 121/69 HR: 90  Standing BP: 119/72 HR: 98  Site: --  Mode: --  Orthostatic VS  11-08-24 @ 22:01  Lying BP: 127/76 HR: 84  Sitting BP: 109/88 HR: 92  Standing BP: 127/77 HR: 98  Site: --  Mode: --    Lipid Panel: